# Patient Record
Sex: FEMALE | Race: BLACK OR AFRICAN AMERICAN | NOT HISPANIC OR LATINO | URBAN - METROPOLITAN AREA
[De-identification: names, ages, dates, MRNs, and addresses within clinical notes are randomized per-mention and may not be internally consistent; named-entity substitution may affect disease eponyms.]

---

## 2017-06-08 ENCOUNTER — EMERGENCY (EMERGENCY)
Facility: HOSPITAL | Age: 57
LOS: 1 days | Discharge: HOME | End: 2017-06-08
Attending: EMERGENCY MEDICINE | Admitting: EMERGENCY MEDICINE
Payer: COMMERCIAL

## 2017-06-08 VITALS
HEART RATE: 75 BPM | OXYGEN SATURATION: 97 % | RESPIRATION RATE: 18 BRPM | DIASTOLIC BLOOD PRESSURE: 85 MMHG | SYSTOLIC BLOOD PRESSURE: 141 MMHG | TEMPERATURE: 98 F

## 2017-06-08 DIAGNOSIS — Z88.1 ALLERGY STATUS TO OTHER ANTIBIOTIC AGENTS STATUS: ICD-10-CM

## 2017-06-08 DIAGNOSIS — G89.29 OTHER CHRONIC PAIN: ICD-10-CM

## 2017-06-08 DIAGNOSIS — Z88.8 ALLERGY STATUS TO OTHER DRUGS, MEDICAMENTS AND BIOLOGICAL SUBSTANCES STATUS: ICD-10-CM

## 2017-06-08 DIAGNOSIS — I10 ESSENTIAL (PRIMARY) HYPERTENSION: ICD-10-CM

## 2017-06-08 DIAGNOSIS — M25.561 PAIN IN RIGHT KNEE: ICD-10-CM

## 2017-06-08 DIAGNOSIS — M06.9 RHEUMATOID ARTHRITIS, UNSPECIFIED: ICD-10-CM

## 2017-06-08 PROCEDURE — 73562 X-RAY EXAM OF KNEE 3: CPT | Mod: 26,RT

## 2017-06-08 PROCEDURE — 99283 EMERGENCY DEPT VISIT LOW MDM: CPT

## 2017-06-08 PROCEDURE — 73562 X-RAY EXAM OF KNEE 3: CPT

## 2017-06-08 PROCEDURE — 99283 EMERGENCY DEPT VISIT LOW MDM: CPT | Mod: 25

## 2017-06-08 NOTE — ED PROVIDER NOTE - ATTENDING CONTRIBUTION TO CARE
pt with RA presents with chronic right knee pain, no acute trauma, pe - right anterior knee tenderness no fx on xray, most likely chronic RA - recommend RICE, ortho/pmd/rheum follow up - pt has cane and able to ambulate.

## 2017-06-08 NOTE — ED PROVIDER NOTE - OBJECTIVE STATEMENT
Patient is a 56 yo Female with Pmhx of RA (On Hydroxychloroquine, Methotrexate, Prednisone), who presents to the ED for acute on chronic Right knee pain Patient is a 56 yo Female with Pmhx of RA (On Hydroxychloroquine, Methotrexate, Prednisone), who presents to the ED for acute on chronic Right knee pain. Pt has had ongoing knee pain for years. 4 months ago her Rheumatologist gave her a Prednisone shot but she experienced little to no relief with it. Approx 2 weeks ago she was walking and crossing a sidewalk when she felt as if she stumped on her knee. She carried on with her activities initially but recently has felt that her pain has gotten more swollen and painful and difficult to extend.

## 2017-06-08 NOTE — ED PROVIDER NOTE - PHYSICAL EXAMINATION
.VITAL SIGNS:  T(F): 97.9, Max: 97.9 (06-08 @ 16:06)  HR: 75 (75 - 75)  BP: 141/85 (141/85 - 141/85)  RR: 18 (18 - 18)  SpO2: 97% (97% - 97%)    PHYSICAL EXAM:  Constitutional: WDWN resting comfortably; Midl distress when examined  Head: NC/AT  Eyes: PERRL, EOMI, anicteric sclera  ENT:  MMM  Neck: supple; no JVD or thyromegaly  Respiratory: CTA B/L; no W/R/R,   Cardiac: +S1/S2; RRR; no M/R/G; PMI non-displaced  Gastrointestinal: soft, NT/ND; no rebound or guarding; +BSx4  Extremities: WWP, Right knee swollen, mildly tender. Unable to fully extend her left. Gait hesitant with canne use  Neurologic: AAOx3; CNII-XII grossly intact; no focal deficits

## 2017-06-08 NOTE — ED ADULT NURSE NOTE - OBJECTIVE STATEMENT
57 year old female pt with c/o of R knee pain x1year.  States worsening this week.  A+OX3, ambulatory w steady gait.

## 2017-06-08 NOTE — ED PROVIDER NOTE - MEDICAL DECISION MAKING DETAILS
Patient with chronic right knee pain. Noticed more swelling and pain recently after a sidewalk incident. Was recommend for an Xray by her PMD but failed to do so. PE shows swelling that pt reports is chronic. No crepitus, warmth or tenderness. Xray of the knee shows no fracture, only swelling. Dc with plan to f/u with PMD and rheumatologist

## 2017-07-21 ENCOUNTER — APPOINTMENT (OUTPATIENT)
Dept: OPHTHALMOLOGY | Facility: CLINIC | Age: 57
End: 2017-07-21

## 2017-07-21 RX ORDER — METHOTREXATE 2.5 MG/1
2.5 TABLET ORAL
Qty: 32 | Refills: 6 | Status: ACTIVE | COMMUNITY
Start: 2017-07-21 | End: 1900-01-01

## 2018-01-10 ENCOUNTER — APPOINTMENT (OUTPATIENT)
Dept: OPHTHALMOLOGY | Facility: CLINIC | Age: 58
End: 2018-01-10
Payer: COMMERCIAL

## 2018-01-10 DIAGNOSIS — Z79.899 OTHER LONG TERM (CURRENT) DRUG THERAPY: ICD-10-CM

## 2018-01-10 DIAGNOSIS — H15.013 ANTERIOR SCLERITIS, BILATERAL: ICD-10-CM

## 2018-01-10 DIAGNOSIS — H40.003 PREGLAUCOMA, UNSPECIFIED, BILATERAL: ICD-10-CM

## 2018-01-10 PROCEDURE — 92083 EXTENDED VISUAL FIELD XM: CPT

## 2018-01-10 PROCEDURE — 92134 CPTRZ OPH DX IMG PST SGM RTA: CPT

## 2018-01-10 PROCEDURE — 76514 ECHO EXAM OF EYE THICKNESS: CPT

## 2018-01-10 PROCEDURE — 92014 COMPRE OPH EXAM EST PT 1/>: CPT

## 2018-10-09 ENCOUNTER — EMERGENCY (EMERGENCY)
Facility: HOSPITAL | Age: 58
LOS: 1 days | Discharge: HOME | End: 2018-10-09
Attending: EMERGENCY MEDICINE | Admitting: EMERGENCY MEDICINE
Payer: COMMERCIAL

## 2018-10-09 VITALS
WEIGHT: 208.12 LBS | TEMPERATURE: 98 F | HEIGHT: 68 IN | DIASTOLIC BLOOD PRESSURE: 70 MMHG | SYSTOLIC BLOOD PRESSURE: 124 MMHG | RESPIRATION RATE: 18 BRPM | OXYGEN SATURATION: 98 % | HEART RATE: 87 BPM

## 2018-10-09 DIAGNOSIS — R51 HEADACHE: ICD-10-CM

## 2018-10-09 DIAGNOSIS — Z88.1 ALLERGY STATUS TO OTHER ANTIBIOTIC AGENTS STATUS: ICD-10-CM

## 2018-10-09 DIAGNOSIS — Z91.040 LATEX ALLERGY STATUS: ICD-10-CM

## 2018-10-09 PROBLEM — M06.9 RHEUMATOID ARTHRITIS, UNSPECIFIED: Chronic | Status: ACTIVE | Noted: 2017-06-08

## 2018-10-09 PROCEDURE — 99283 EMERGENCY DEPT VISIT LOW MDM: CPT

## 2018-10-09 PROCEDURE — 99282 EMERGENCY DEPT VISIT SF MDM: CPT

## 2018-10-09 NOTE — ED PROVIDER NOTE - MEDICAL DECISION MAKING DETAILS
57 y/o female with scalp tenderness that improves with essential oils. No findings on physical examination without ttp. Pt denies "headache" and does not want pain medication. Pt wants to make sure the pain does not have anything poking into her head. It does not. Pt advised to use oils if that comforts and eases her pain. Neuro intact. Return to ed for any concerning symptoms.

## 2018-10-09 NOTE — ED PROVIDER NOTE - OBJECTIVE STATEMENT
59 y/o female with a PMHx of RA is present with scalp tenderness x1 day. Pt reports pain located on the right side of her scalp. She states it is a weird sometimes sharp sensation. Pt reports she has had this sensation in the past which normally subsides within 15-20 minutes. Pt reports yesterday she applied "essential oils" to her scalp and the discomfort improved. Pt reports hx of getting "weave" placed on her scalp for many years. The current weave she has in place was woven in 1 month ago. Pt does not want pain medication. She denies the following: dizziness, blurred vision, double vision, nausea/vomiting, chest pain, sob, numbness/tingling, trauma, use of blood thinners, hx of tbi/seizures.

## 2018-10-09 NOTE — ED ADULT NURSE NOTE - NSIMPLEMENTINTERV_GEN_ALL_ED
Implemented All Universal Safety Interventions:  [place] to call system. Call bell, personal items and telephone within reach. Instruct patient to call for assistance. Room bathroom lighting operational. Non-slip footwear when patient is off stretcher. Physically safe environment: no spills, clutter or unnecessary equipment. Stretcher in lowest position, wheels locked, appropriate side rails in place.

## 2018-10-09 NOTE — ED ADULT NURSE NOTE - CHPI ED NUR SYMPTOMS NEG
no blurred vision/no change in level of consciousness/no loss of consciousness/no numbness/no nausea/no confusion/no vomiting/no dizziness/no fever

## 2018-10-09 NOTE — ED PROVIDER NOTE - ATTENDING CONTRIBUTION TO CARE
59 y/o female with a PMHx of RA is presents with right sided sharp scalp pain x 1 day. Yesterday she applied "essential oils" to her scalp and the discomfort improved. Pt reports hx of getting "weave" placed on her scalp for many years. She denies the following: dizziness, blurred vision, double vision, nausea/vomiting, chest pain, sob, numbness/tingling, focal weakness, trauma, use of blood thinners. Pt AAO, NAD, Scalp with no acute findings or rashes- hair weave in place, Neuro intact. Scalp massage with oils and OTC pain meds prn pain. ?weave pulling on her hair. To f/up outpt.

## 2018-12-08 NOTE — ED ADULT NURSE NOTE - GASTROINTESTINAL ASSESSMENT
Ventricular Rate : 54   Atrial Rate : 54   P-R Interval : 182   QRS Duration : 86   Q-T Interval : 418   QTC Calculation(Bezet) : 396   P Axis : 55   R Axis : 14   T Axis : 30   Diagnosis : Sinus bradycardia~Otherwise normal ECG~No previous ECGs available~Confirmed by SHAE ADKINS MASOOD ([id number]) on 11/12/2018 4:08:39 PM     
WDL

## 2019-02-03 ENCOUNTER — RX RENEWAL (OUTPATIENT)
Age: 59
End: 2019-02-03

## 2019-02-03 RX ORDER — PREDNISOLONE ACETATE 10 MG/ML
1 SUSPENSION/ DROPS OPHTHALMIC
Qty: 10 | Refills: 1 | Status: ACTIVE | COMMUNITY
Start: 2018-01-10 | End: 1900-01-01

## 2019-07-31 ENCOUNTER — APPOINTMENT (OUTPATIENT)
Dept: OPHTHALMOLOGY | Facility: CLINIC | Age: 59
End: 2019-07-31
Payer: COMMERCIAL

## 2019-07-31 ENCOUNTER — NON-APPOINTMENT (OUTPATIENT)
Age: 59
End: 2019-07-31

## 2019-07-31 PROCEDURE — 92134 CPTRZ OPH DX IMG PST SGM RTA: CPT

## 2019-07-31 PROCEDURE — 92014 COMPRE OPH EXAM EST PT 1/>: CPT

## 2019-11-25 ENCOUNTER — NON-APPOINTMENT (OUTPATIENT)
Age: 59
End: 2019-11-25

## 2019-11-25 ENCOUNTER — APPOINTMENT (OUTPATIENT)
Dept: OPHTHALMOLOGY | Facility: CLINIC | Age: 59
End: 2019-11-25
Payer: COMMERCIAL

## 2019-11-25 PROCEDURE — 92014 COMPRE OPH EXAM EST PT 1/>: CPT

## 2021-05-11 ENCOUNTER — TRANSCRIPTION ENCOUNTER (OUTPATIENT)
Age: 61
End: 2021-05-11

## 2021-11-18 ENCOUNTER — NON-APPOINTMENT (OUTPATIENT)
Age: 61
End: 2021-11-18

## 2021-11-18 ENCOUNTER — APPOINTMENT (OUTPATIENT)
Dept: OPHTHALMOLOGY | Facility: CLINIC | Age: 61
End: 2021-11-18
Payer: COMMERCIAL

## 2021-11-18 PROCEDURE — 92250 FUNDUS PHOTOGRAPHY W/I&R: CPT

## 2021-11-18 PROCEDURE — 92014 COMPRE OPH EXAM EST PT 1/>: CPT

## 2022-03-22 ENCOUNTER — APPOINTMENT (OUTPATIENT)
Dept: OPHTHALMOLOGY | Facility: CLINIC | Age: 62
End: 2022-03-22

## 2022-07-01 ENCOUNTER — EMERGENCY (EMERGENCY)
Facility: HOSPITAL | Age: 62
LOS: 1 days | Discharge: HOME | End: 2022-07-01
Attending: EMERGENCY MEDICINE | Admitting: EMERGENCY MEDICINE
Payer: COMMERCIAL

## 2022-07-01 VITALS
HEART RATE: 78 BPM | OXYGEN SATURATION: 96 % | SYSTOLIC BLOOD PRESSURE: 166 MMHG | HEIGHT: 68 IN | RESPIRATION RATE: 16 BRPM | TEMPERATURE: 98 F | DIASTOLIC BLOOD PRESSURE: 96 MMHG | WEIGHT: 225.09 LBS

## 2022-07-01 LAB
ALBUMIN SERPL ELPH-MCNC: 4.3 G/DL — SIGNIFICANT CHANGE UP (ref 3.3–5)
ALP SERPL-CCNC: 110 U/L — SIGNIFICANT CHANGE UP (ref 40–120)
ALT FLD-CCNC: 18 U/L — SIGNIFICANT CHANGE UP (ref 10–45)
ANION GAP SERPL CALC-SCNC: 12 MMOL/L — SIGNIFICANT CHANGE UP (ref 5–17)
APPEARANCE UR: CLEAR — SIGNIFICANT CHANGE UP
AST SERPL-CCNC: 21 U/L — SIGNIFICANT CHANGE UP (ref 10–40)
BASOPHILS # BLD AUTO: 0.04 K/UL — SIGNIFICANT CHANGE UP (ref 0–0.2)
BASOPHILS NFR BLD AUTO: 0.6 % — SIGNIFICANT CHANGE UP (ref 0–2)
BILIRUB SERPL-MCNC: 0.4 MG/DL — SIGNIFICANT CHANGE UP (ref 0.2–1.2)
BILIRUB UR-MCNC: NEGATIVE — SIGNIFICANT CHANGE UP
BUN SERPL-MCNC: 15 MG/DL — SIGNIFICANT CHANGE UP (ref 7–23)
CALCIUM SERPL-MCNC: 10.4 MG/DL — SIGNIFICANT CHANGE UP (ref 8.4–10.5)
CHLORIDE SERPL-SCNC: 106 MMOL/L — SIGNIFICANT CHANGE UP (ref 96–108)
CO2 SERPL-SCNC: 23 MMOL/L — SIGNIFICANT CHANGE UP (ref 22–31)
COLOR SPEC: YELLOW — SIGNIFICANT CHANGE UP
CREAT SERPL-MCNC: 0.73 MG/DL — SIGNIFICANT CHANGE UP (ref 0.5–1.3)
DIFF PNL FLD: ABNORMAL
EGFR: 94 ML/MIN/1.73M2 — SIGNIFICANT CHANGE UP
EOSINOPHIL # BLD AUTO: 0.14 K/UL — SIGNIFICANT CHANGE UP (ref 0–0.5)
EOSINOPHIL NFR BLD AUTO: 2 % — SIGNIFICANT CHANGE UP (ref 0–6)
GLUCOSE SERPL-MCNC: 94 MG/DL — SIGNIFICANT CHANGE UP (ref 70–99)
GLUCOSE UR QL: NEGATIVE — SIGNIFICANT CHANGE UP
HCT VFR BLD CALC: 39.5 % — SIGNIFICANT CHANGE UP (ref 34.5–45)
HGB BLD-MCNC: 13.1 G/DL — SIGNIFICANT CHANGE UP (ref 11.5–15.5)
IMM GRANULOCYTES NFR BLD AUTO: 0.1 % — SIGNIFICANT CHANGE UP (ref 0–1.5)
KETONES UR-MCNC: NEGATIVE — SIGNIFICANT CHANGE UP
LEUKOCYTE ESTERASE UR-ACNC: ABNORMAL
LYMPHOCYTES # BLD AUTO: 2.42 K/UL — SIGNIFICANT CHANGE UP (ref 1–3.3)
LYMPHOCYTES # BLD AUTO: 35.4 % — SIGNIFICANT CHANGE UP (ref 13–44)
MCHC RBC-ENTMCNC: 28.8 PG — SIGNIFICANT CHANGE UP (ref 27–34)
MCHC RBC-ENTMCNC: 33.2 GM/DL — SIGNIFICANT CHANGE UP (ref 32–36)
MCV RBC AUTO: 86.8 FL — SIGNIFICANT CHANGE UP (ref 80–100)
MONOCYTES # BLD AUTO: 0.53 K/UL — SIGNIFICANT CHANGE UP (ref 0–0.9)
MONOCYTES NFR BLD AUTO: 7.7 % — SIGNIFICANT CHANGE UP (ref 2–14)
NEUTROPHILS # BLD AUTO: 3.7 K/UL — SIGNIFICANT CHANGE UP (ref 1.8–7.4)
NEUTROPHILS NFR BLD AUTO: 54.2 % — SIGNIFICANT CHANGE UP (ref 43–77)
NITRITE UR-MCNC: NEGATIVE — SIGNIFICANT CHANGE UP
NRBC # BLD: 0 /100 WBCS — SIGNIFICANT CHANGE UP (ref 0–0)
PH UR: 6.5 — SIGNIFICANT CHANGE UP (ref 5–8)
PLATELET # BLD AUTO: 178 K/UL — SIGNIFICANT CHANGE UP (ref 150–400)
POTASSIUM SERPL-MCNC: 3.6 MMOL/L — SIGNIFICANT CHANGE UP (ref 3.5–5.3)
POTASSIUM SERPL-SCNC: 3.6 MMOL/L — SIGNIFICANT CHANGE UP (ref 3.5–5.3)
PROT SERPL-MCNC: 7.5 G/DL — SIGNIFICANT CHANGE UP (ref 6–8.3)
PROT UR-MCNC: NEGATIVE MG/DL — SIGNIFICANT CHANGE UP
RBC # BLD: 4.55 M/UL — SIGNIFICANT CHANGE UP (ref 3.8–5.2)
RBC # FLD: 14.8 % — HIGH (ref 10.3–14.5)
SODIUM SERPL-SCNC: 141 MMOL/L — SIGNIFICANT CHANGE UP (ref 135–145)
SP GR SPEC: 1.02 — SIGNIFICANT CHANGE UP (ref 1–1.03)
UROBILINOGEN FLD QL: 0.2 E.U./DL — SIGNIFICANT CHANGE UP
WBC # BLD: 6.84 K/UL — SIGNIFICANT CHANGE UP (ref 3.8–10.5)
WBC # FLD AUTO: 6.84 K/UL — SIGNIFICANT CHANGE UP (ref 3.8–10.5)

## 2022-07-01 PROCEDURE — 99285 EMERGENCY DEPT VISIT HI MDM: CPT

## 2022-07-01 NOTE — ED ADULT NURSE NOTE - OBJECTIVE STATEMENT
Pt presents to the ED c/o left flank pain that began a few hours ago. Pt w/ hx of kidney stones. Pt reports UTI 3 weeks ago. Denies fever, chills, NVD, urinary sx, abdominal pain.    labs were obtained and sent, as noted, awaiting provider eval.

## 2022-07-01 NOTE — ED ADULT TRIAGE NOTE - CHIEF COMPLAINT QUOTE
Pt presents to the ED c/o left flank pain that began a few hours ago. Pt w/ hx of kidney stones. Pt reports UTI 3 weeks ago. Denies fever, chills, NVD, urinary sx, abdominal pain.

## 2022-07-01 NOTE — ED ADULT NURSE REASSESSMENT NOTE - NS ED NURSE REASSESS COMMENT FT1
interventions as noted, snehal. well, assessment on-going, awaiting provider eval/further orders, pt. utd on current poc, with understanding verbalized

## 2022-07-02 VITALS — SYSTOLIC BLOOD PRESSURE: 133 MMHG | OXYGEN SATURATION: 98 % | DIASTOLIC BLOOD PRESSURE: 84 MMHG | HEART RATE: 66 BPM

## 2022-07-02 PROCEDURE — 36415 COLL VENOUS BLD VENIPUNCTURE: CPT

## 2022-07-02 PROCEDURE — 74176 CT ABD & PELVIS W/O CONTRAST: CPT | Mod: 26,MA

## 2022-07-02 PROCEDURE — 85025 COMPLETE CBC W/AUTO DIFF WBC: CPT

## 2022-07-02 PROCEDURE — 81001 URINALYSIS AUTO W/SCOPE: CPT

## 2022-07-02 PROCEDURE — 80053 COMPREHEN METABOLIC PANEL: CPT

## 2022-07-02 PROCEDURE — 74176 CT ABD & PELVIS W/O CONTRAST: CPT | Mod: MA

## 2022-07-02 PROCEDURE — 87086 URINE CULTURE/COLONY COUNT: CPT

## 2022-07-02 PROCEDURE — 99284 EMERGENCY DEPT VISIT MOD MDM: CPT | Mod: 25

## 2022-07-02 RX ORDER — AZTREONAM 2 G
1 VIAL (EA) INJECTION
Qty: 20 | Refills: 0
Start: 2022-07-02 | End: 2022-07-11

## 2022-07-02 RX ADMIN — Medication 1 TABLET(S): at 02:00

## 2022-07-02 NOTE — ED PROVIDER NOTE - PATIENT PORTAL LINK FT
You can access the FollowMyHealth Patient Portal offered by [facility] by registering at the following website: [email or web address]. By joining [facility]’s FollowMyHealth portal, you will also be able to view your health information using other applications (apps) compatible with our system.

## 2022-07-02 NOTE — ED PROVIDER NOTE - NSFOLLOWUPINSTRUCTIONS_ED_ALL_ED_FT
Follow-up with your urologist      Flank Pain, Adult    Flank pain is pain that is located on the side of the body between the upper abdomen and the spine. This area is called the flank. The pain may occur over a short period of time (acute), or it may be long-term or recurring (chronic). It may be mild or severe. Flank pain can be caused by many things, including:  •Muscle soreness or injury.    •Kidney infection, kidney stones, or kidney disease.    •Stress.    •A disease of the spine (vertebral disk disease).    •A lung infection (pneumonia).    •Fluid around the lungs (pulmonary edema).    •A skin rash caused by the chickenpox virus (shingles).    •Tumors that affect the back of the abdomen.    •Gallbladder disease.    Follow these instructions at home:  A comparison of three sample cups showing dark yellow, yellow, and pale yellow urine.   •Drink enough fluid to keep your urine pale yellow.    •Rest as told by your health care provider.    •Take over-the-counter and prescription medicines only as told by your health care provider.    •Keep a journal to track what has caused your flank pain and what has made it feel better.    •Keep all follow-up visits. This is important.    Contact a health care provider if:    •Your pain is not controlled with medicine.    •You have new symptoms.    •Your pain gets worse.    •Your symptoms last longer than 2–3 days.    •You have trouble urinating or you are urinating very frequently.    Get help right away if:    •You have trouble breathing or you are short of breath.    •Your abdomen hurts or it is swollen or red.    •You have nausea or vomiting.    •You feel faint, or you faint.    •You have blood in your urine.    •You have flank pain and a fever.    These symptoms may represent a serious problem that is an emergency. Do not wait to see if the symptoms will go away. Get medical help right away. Call your local emergency services (911 in the [place] Do not drive yourself to the hospital.     Summary    •Flank pain is pain that is located on the side of the body between the upper abdomen and the spine.    •The pain may occur over a short period of time (acute), or it may be long-term or recurring (chronic). It may be mild or severe.    •Flank pain can be caused by many things.    •Contact your health care provider if your symptoms get worse or last longer than 2–3 days.    This information is not intended to replace advice given to you by your health care provider. Make sure you discuss any questions you have with your health care provider.

## 2022-07-02 NOTE — ED PROVIDER NOTE - NS ED MD DISPO DISCHARGE
pt left seated in chair post Eval; chair alarm donned; LE's elevated on stool/pillow; callbell in reach; pt instructed not to get up alone; call nursing for assist; kyle well; pt denied pain post Eval; RN Katelyn made aware pt OOB Home

## 2022-07-02 NOTE — ED PROVIDER NOTE - PROGRESS NOTE DETAILS
b/l nephrolithiasis, will treat bacteriuria. recommend  f/u  I have discussed the discharge plan with the patient. The patient agrees with the plan, as discussed.  The patient understands Emergency Department diagnosis is a preliminary diagnosis often based on limited information and that the patient must adhere to the follow-up plan as discussed.  The patient understands that if the symptoms worsen or if prescribed medications do not have the desired/planned effect that the patient may return to the Emergency Department at any time for further evaluation and treatment.

## 2022-07-02 NOTE — ED PROVIDER NOTE - CLINICAL SUMMARY MEDICAL DECISION MAKING FREE TEXT BOX
left flank pain, no abd pian on exam, no guarding, no rebound, afebrile  -check labs  -check ua  -CT  pt declined pain medication at this time

## 2022-07-02 NOTE — ED PROVIDER NOTE - OBJECTIVE STATEMENT
62F hx RA, htn, c/o left flank pain. pt states started today. no radiation. no fevers, no n/v/d. states was treated for UTI with bactrim 3 wks ago.  no dysuria currently. pt states has had kidney stones in the past.

## 2022-07-04 DIAGNOSIS — M06.9 RHEUMATOID ARTHRITIS, UNSPECIFIED: ICD-10-CM

## 2022-07-04 DIAGNOSIS — I10 ESSENTIAL (PRIMARY) HYPERTENSION: ICD-10-CM

## 2022-07-04 DIAGNOSIS — Z91.040 LATEX ALLERGY STATUS: ICD-10-CM

## 2022-07-04 DIAGNOSIS — R10.12 LEFT UPPER QUADRANT PAIN: ICD-10-CM

## 2022-07-04 DIAGNOSIS — R10.32 LEFT LOWER QUADRANT PAIN: ICD-10-CM

## 2022-07-04 DIAGNOSIS — Z88.1 ALLERGY STATUS TO OTHER ANTIBIOTIC AGENTS STATUS: ICD-10-CM

## 2022-07-19 PROBLEM — H40.003 GLAUCOMA SUSPECT OF BOTH EYES: Status: ACTIVE | Noted: 2017-07-21

## 2022-07-24 ENCOUNTER — EMERGENCY (EMERGENCY)
Facility: HOSPITAL | Age: 62
LOS: 1 days | Discharge: HOME | End: 2022-07-24
Attending: EMERGENCY MEDICINE | Admitting: EMERGENCY MEDICINE
Payer: COMMERCIAL

## 2022-07-24 VITALS
HEART RATE: 98 BPM | DIASTOLIC BLOOD PRESSURE: 94 MMHG | RESPIRATION RATE: 18 BRPM | TEMPERATURE: 98 F | HEIGHT: 68 IN | OXYGEN SATURATION: 97 % | SYSTOLIC BLOOD PRESSURE: 157 MMHG | WEIGHT: 223.11 LBS

## 2022-07-24 VITALS
HEART RATE: 68 BPM | DIASTOLIC BLOOD PRESSURE: 81 MMHG | SYSTOLIC BLOOD PRESSURE: 135 MMHG | RESPIRATION RATE: 18 BRPM | TEMPERATURE: 98 F | OXYGEN SATURATION: 98 %

## 2022-07-24 LAB
ALBUMIN SERPL ELPH-MCNC: 4.5 G/DL — SIGNIFICANT CHANGE UP (ref 3.3–5)
ALP SERPL-CCNC: 100 U/L — SIGNIFICANT CHANGE UP (ref 40–120)
ALT FLD-CCNC: 15 U/L — SIGNIFICANT CHANGE UP (ref 10–45)
ANION GAP SERPL CALC-SCNC: 9 MMOL/L — SIGNIFICANT CHANGE UP (ref 5–17)
APPEARANCE UR: CLEAR — SIGNIFICANT CHANGE UP
AST SERPL-CCNC: 17 U/L — SIGNIFICANT CHANGE UP (ref 10–40)
BACTERIA # UR AUTO: PRESENT /HPF
BASOPHILS # BLD AUTO: 0.03 K/UL — SIGNIFICANT CHANGE UP (ref 0–0.2)
BASOPHILS NFR BLD AUTO: 0.4 % — SIGNIFICANT CHANGE UP (ref 0–2)
BILIRUB SERPL-MCNC: 0.4 MG/DL — SIGNIFICANT CHANGE UP (ref 0.2–1.2)
BILIRUB UR-MCNC: NEGATIVE — SIGNIFICANT CHANGE UP
BUN SERPL-MCNC: 12 MG/DL — SIGNIFICANT CHANGE UP (ref 7–23)
CALCIUM SERPL-MCNC: 10.6 MG/DL — HIGH (ref 8.4–10.5)
CHLORIDE SERPL-SCNC: 107 MMOL/L — SIGNIFICANT CHANGE UP (ref 96–108)
CO2 SERPL-SCNC: 26 MMOL/L — SIGNIFICANT CHANGE UP (ref 22–31)
COLOR SPEC: YELLOW — SIGNIFICANT CHANGE UP
CREAT SERPL-MCNC: 0.66 MG/DL — SIGNIFICANT CHANGE UP (ref 0.5–1.3)
DIFF PNL FLD: ABNORMAL
EGFR: 100 ML/MIN/1.73M2 — SIGNIFICANT CHANGE UP
EOSINOPHIL # BLD AUTO: 0.06 K/UL — SIGNIFICANT CHANGE UP (ref 0–0.5)
EOSINOPHIL NFR BLD AUTO: 0.8 % — SIGNIFICANT CHANGE UP (ref 0–6)
EPI CELLS # UR: ABNORMAL /HPF (ref 0–5)
GLUCOSE SERPL-MCNC: 100 MG/DL — HIGH (ref 70–99)
GLUCOSE UR QL: NEGATIVE — SIGNIFICANT CHANGE UP
HCT VFR BLD CALC: 42.4 % — SIGNIFICANT CHANGE UP (ref 34.5–45)
HGB BLD-MCNC: 13.8 G/DL — SIGNIFICANT CHANGE UP (ref 11.5–15.5)
IMM GRANULOCYTES NFR BLD AUTO: 0.3 % — SIGNIFICANT CHANGE UP (ref 0–1.5)
KETONES UR-MCNC: NEGATIVE — SIGNIFICANT CHANGE UP
LEUKOCYTE ESTERASE UR-ACNC: ABNORMAL
LYMPHOCYTES # BLD AUTO: 1.69 K/UL — SIGNIFICANT CHANGE UP (ref 1–3.3)
LYMPHOCYTES # BLD AUTO: 21.8 % — SIGNIFICANT CHANGE UP (ref 13–44)
MAGNESIUM SERPL-MCNC: 2 MG/DL — SIGNIFICANT CHANGE UP (ref 1.6–2.6)
MCHC RBC-ENTMCNC: 28.5 PG — SIGNIFICANT CHANGE UP (ref 27–34)
MCHC RBC-ENTMCNC: 32.5 GM/DL — SIGNIFICANT CHANGE UP (ref 32–36)
MCV RBC AUTO: 87.4 FL — SIGNIFICANT CHANGE UP (ref 80–100)
MONOCYTES # BLD AUTO: 0.42 K/UL — SIGNIFICANT CHANGE UP (ref 0–0.9)
MONOCYTES NFR BLD AUTO: 5.4 % — SIGNIFICANT CHANGE UP (ref 2–14)
NEUTROPHILS # BLD AUTO: 5.55 K/UL — SIGNIFICANT CHANGE UP (ref 1.8–7.4)
NEUTROPHILS NFR BLD AUTO: 71.3 % — SIGNIFICANT CHANGE UP (ref 43–77)
NITRITE UR-MCNC: NEGATIVE — SIGNIFICANT CHANGE UP
NRBC # BLD: 0 /100 WBCS — SIGNIFICANT CHANGE UP (ref 0–0)
PH UR: 6 — SIGNIFICANT CHANGE UP (ref 5–8)
PLATELET # BLD AUTO: 169 K/UL — SIGNIFICANT CHANGE UP (ref 150–400)
POTASSIUM SERPL-MCNC: 4 MMOL/L — SIGNIFICANT CHANGE UP (ref 3.5–5.3)
POTASSIUM SERPL-SCNC: 4 MMOL/L — SIGNIFICANT CHANGE UP (ref 3.5–5.3)
PROT SERPL-MCNC: 7.8 G/DL — SIGNIFICANT CHANGE UP (ref 6–8.3)
PROT UR-MCNC: NEGATIVE MG/DL — SIGNIFICANT CHANGE UP
RBC # BLD: 4.85 M/UL — SIGNIFICANT CHANGE UP (ref 3.8–5.2)
RBC # FLD: 14.8 % — HIGH (ref 10.3–14.5)
RBC CASTS # UR COMP ASSIST: < 5 /HPF — SIGNIFICANT CHANGE UP
SARS-COV-2 RNA SPEC QL NAA+PROBE: NEGATIVE — SIGNIFICANT CHANGE UP
SODIUM SERPL-SCNC: 142 MMOL/L — SIGNIFICANT CHANGE UP (ref 135–145)
SP GR SPEC: <=1.005 — SIGNIFICANT CHANGE UP (ref 1–1.03)
UROBILINOGEN FLD QL: 0.2 E.U./DL — SIGNIFICANT CHANGE UP
WBC # BLD: 7.77 K/UL — SIGNIFICANT CHANGE UP (ref 3.8–10.5)
WBC # FLD AUTO: 7.77 K/UL — SIGNIFICANT CHANGE UP (ref 3.8–10.5)
WBC UR QL: > 10 /HPF

## 2022-07-24 PROCEDURE — 83735 ASSAY OF MAGNESIUM: CPT

## 2022-07-24 PROCEDURE — 36415 COLL VENOUS BLD VENIPUNCTURE: CPT

## 2022-07-24 PROCEDURE — 81001 URINALYSIS AUTO W/SCOPE: CPT

## 2022-07-24 PROCEDURE — 80053 COMPREHEN METABOLIC PANEL: CPT

## 2022-07-24 PROCEDURE — 96374 THER/PROPH/DIAG INJ IV PUSH: CPT

## 2022-07-24 PROCEDURE — 87086 URINE CULTURE/COLONY COUNT: CPT

## 2022-07-24 PROCEDURE — 99284 EMERGENCY DEPT VISIT MOD MDM: CPT | Mod: 25

## 2022-07-24 PROCEDURE — 93010 ELECTROCARDIOGRAM REPORT: CPT | Mod: NC

## 2022-07-24 PROCEDURE — 93005 ELECTROCARDIOGRAM TRACING: CPT

## 2022-07-24 PROCEDURE — 99284 EMERGENCY DEPT VISIT MOD MDM: CPT

## 2022-07-24 PROCEDURE — 85025 COMPLETE CBC W/AUTO DIFF WBC: CPT

## 2022-07-24 PROCEDURE — 87635 SARS-COV-2 COVID-19 AMP PRB: CPT

## 2022-07-24 RX ORDER — ONDANSETRON 8 MG/1
4 TABLET, FILM COATED ORAL ONCE
Refills: 0 | Status: COMPLETED | OUTPATIENT
Start: 2022-07-24 | End: 2022-07-24

## 2022-07-24 RX ORDER — SODIUM CHLORIDE 9 MG/ML
1000 INJECTION INTRAMUSCULAR; INTRAVENOUS; SUBCUTANEOUS ONCE
Refills: 0 | Status: COMPLETED | OUTPATIENT
Start: 2022-07-24 | End: 2022-07-24

## 2022-07-24 RX ADMIN — SODIUM CHLORIDE 1000 MILLILITER(S): 9 INJECTION INTRAMUSCULAR; INTRAVENOUS; SUBCUTANEOUS at 16:44

## 2022-07-24 RX ADMIN — ONDANSETRON 4 MILLIGRAM(S): 8 TABLET, FILM COATED ORAL at 16:44

## 2022-07-24 NOTE — ED PROVIDER NOTE - PHYSICAL EXAMINATION
VITAL SIGNS: I have reviewed nursing notes and confirm.  CONSTITUTIONAL: Well appearing, in no acute distress.   SKIN:  warm and dry, no acute rash.   HEAD:  normocephalic, atraumatic.  EYES: EOM intact; conjunctiva and sclera clear.  ENT: No nasal discharge; airway clear.  Mild dry mucosa membrane.   NECK: Supple; non tender.  CARD: S1, S2 normal; no murmurs, gallops, or rubs. Regular rate and rhythm.   RESP:  Clear to auscultation b/l, no wheezes, rales or rhonchi.  ABD: Normal bowel sounds; soft; non-distended; non-tender; no guarding/ rebound.  EXT: Normal ROM. No clubbing, cyanosis or edema. 2+ pulses to b/l ue/le.  NEURO: Alert, oriented, grossly unremarkable  PSYCH: Cooperative, mood and affect appropriate.

## 2022-07-24 NOTE — ED PROVIDER NOTE - OBJECTIVE STATEMENT
63 y/o F with a PMHX HTN, RA, recent covid infection presents to the ED c/o not feeling well today and feeling lightheaded and dizziness. Pt states she had 2 episodes of nausea and vomiting NBNB emesis. States her stool is soft with no blood or melena and no associated abdominal pain. Pt has no fever, chills, CP, SOB, syncope. Pt is having increased urination and dysuria for several weeks, pt was treated for UTI with a 10 day course of antibiotics. Urine sample from 2 days ago was cultured and was negative for significant growth . Pt has no abnormal vaginal bleeding or discharge. 61 y/o F with a PMHX HTN, RA, recent covid infection presents to the ED c/o not feeling well today and feeling lightheaded and dizzy. Pt states she had 2 episodes of nausea and vomiting NBNB emesis today. Also states her stool is soft with no blood or melena and no associated abdominal pain. Pt has no fever, chills, CP, SOB, syncope. Pt is having increased urination and dysuria for several weeks, pt was treated for UTI with a 10 day course of antibiotics. Urine sample from 2 days ago was cultured and was negative for significant growth despite leuk esterase and 11-30 wbcs. Pt has no abnormal vaginal bleeding or discharge.

## 2022-07-24 NOTE — ED PROVIDER NOTE - CLINICAL SUMMARY MEDICAL DECISION MAKING FREE TEXT BOX
Impression:   61 y/o F with a pmhx of HTN, RA, recent covid infection now with nausea, vomiting, diarrhea and dizziness. Pt is afebrile and hemodynamically stable. Pt exam findings are remarkable for mild dry mucosa membrane and abdomen exam benign. EKG is non ischemic with no changes from prior. Pt with no leukocytosis, will check CMP for electrolyte imbalance, PAMELA, and check UA to evaluate for infection. Will hydrate with IV fluids. Impression:   61 y/o F with a pmhx of HTN, RA, recent covid infection now with nausea, vomiting, diarrhea and dizziness. Pt is afebrile and hemodynamically stable. Pt exam findings are remarkable for mild dry mucosa membrane; abdomen exam benign. EKG is non ischemic with non-specific twi. No leukocytosis; ca minimally elev at 10.6; remainder of electrolytes and renal function wnl. UA results are similar to outpt ua on 7/22. Will send ucx and hold on tx for uti given neg ucx at the [facility]. Suspect sx's and lab findings 2/2 dehydration. Pt hydrated with ivf, given zofran with improvement of sx's. Pt now tolerating po. ED evaluation and management discussed with the patient in detail.  Close PMD follow up encouraged.  Strict ED return instructions discussed in detail and patient given the opportunity to ask any questions about their discharge diagnosis and instructions. Patient verbalized understanding.

## 2022-07-24 NOTE — ED ADULT NURSE NOTE - TEMPLATE
[address] [place] [address] PRIMARY CARE  [id number] [facility] [address]  Dept: [phone]    Claudene Phoenix is a 56 y.o. male who presents today for his medical conditions/complaintsas noted below. Chief Complaint   Patient presents with    Other     Discuss appt. with Dr. Romero Organ       HPI:     HPI  Pt states Percocet really didn't help. No fever or chills. Was started on neurontin by neurology. States appears to be helping. Pt had positive lily done. Had positive Anti ds dna. Pt has appt with orthopedics at [facility] 1/28. Pain in back- 6 of 10. LDL Cholesterol (mg/dL)   Date Value   02/10/2018 94   07/23/2017 98       (goal LDL is <100)   AST (U/L)   Date Value   02/10/2018 19     ALT (U/L)   Date Value   02/10/2018 19     BUN (mg/dL)   Date Value   09/24/2019 12     BP Readings from Last 3 Encounters:   01/06/20 138/70   10/30/19 119/77   10/09/19 106/76          (goal 120/80)    Past Medical History:   Diagnosis Date    Arthritis     BACK AND FINGERS    COPD (chronic obstructive pulmonary disease) (HCC)     pt denies    GERD (gastroesophageal reflux disease)     H/O chest pain     H/O dermatitis     [place] (hard of hearing)     LEFT EAR WORSE THAN RIGHT.  Hypertension     Kyphosis     Left eye injury     BUNGY CORD STRAP BUSTED AND INJURED LEFT EYE    Lump of skin     Muscle spasm     LOWER RIGHT BACK    Nocturia     Prolonged emergence from general anesthesia     PATIENT STATES HIS B/P WOULD DROP WITH INTUBATION, GO UP AFTER ET TUBE REMOVED.  PVC's (premature ventricular contractions)     PVC'S.  NO CARDIOLOGIST, PCP DR Ying Sing    Scratch of forearm     Snores     mild, denies apnea, does \"grind\" his teeth    Use of cane as ambulatory aid     Uses walker     Wears glasses     Wears partial dentures     LOWER      Past Surgical History:   Procedure Laterality Date    BACK SURGERY  2006    cage    COLONOSCOPY      CYST REMOVAL Right     index finger    HEMORRHOID SURGERY  2017    HERNIA REPAIR      UMBILICAL    NOSE SURGERY      REALIGNED NOSE    [place] COLSC FLX W/REMOVAL LESION BY HOT BX FORCEPS N/A 2017    COLONOSCOPY POLYPECTOMY HOT BIOPSY performed by Macario Domingo MD at [facility] TOE SURGERY Right     2nd toe    TONSILLECTOMY      AS A CHILD    VASECTOMY         Family History   Problem Relation Age of Onset    Coronary Art Dis Father         premature-- at 52    Diabetes Father     High Blood Pressure Father     Other Father         COPD, EMPHYSEMA    Coronary Art Dis Paternal Uncle         premature    Heart Failure Paternal Uncle     Diabetes Mother        Social History     Tobacco Use    Smoking status: Former Smoker     Packs/day: 1.50     Years: 23.00     Pack years: 34.50     Types: Cigarettes     Last attempt to quit:      Years since quittin.0    Smokeless tobacco: Former User     Types: Chew     Quit date: 9/10/2019   Substance Use Topics    Alcohol use: Not Currently     Comment: He states \"a lot\" daily, quit 19      Current Outpatient Medications   Medication Sig Dispense Refill    gabapentin (NEURONTIN) 300 MG capsule Take 1 capsule by mouth 3 times daily for 30 days. 1 tab a day for 3 days then 1 tab BID for 3 days then 1 tab TID 90 capsule 2    metoprolol tartrate (LOPRESSOR) 50 MG tablet TAKE 1 TABLET BY MOUTH TWO TIMES A DAY  180 tablet 2    LORazepam (ATIVAN) 1 MG tablet Take 1 mg by mouth every 6 hours as needed for Anxiety.  Acetaminophen (TYLENOL ARTHRITIS PAIN PO) Take by mouth      diazepam (VALIUM) 10 MG tablet Take 10 mg by mouth every 6 hours as needed for Anxiety.       glucosamine-chondroitin 500-400 MG tablet Take 1 tablet by mouth daily      furosemide (LASIX) 20 MG tablet Take 1 tablet by mouth 2 times daily 60 tablet 0    meloxicam (MOBIC) 15 MG tablet Take 1 tablet by mouth daily 90 tablet 3    lisinopril (PRINIVIL;ZESTRIL) 10 MG tablet TAKE 1 Psychiatric/Behavioral: Negative for sleep disturbance. Objective:     /70   Pulse 78   Ht 6' (1.829 m)   Wt 275 lb 6.4 oz (124.9 kg)   SpO2 97%   BMI 37.35 kg/m²   Physical Exam  Vitals signs and nursing note reviewed. Constitutional:       General: He is not in acute distress. Appearance: He is well-developed. HENT:      Head: Normocephalic and atraumatic. Eyes:      General: No scleral icterus. Right eye: No discharge. Left eye: No discharge. Conjunctiva/sclera: Conjunctivae normal.      Pupils: Pupils are equal, round, and reactive to light. Neck:      Thyroid: No thyromegaly. Trachea: No tracheal deviation. Cardiovascular:      Rate and Rhythm: Normal rate and regular rhythm. Heart sounds: Normal heart sounds. Comments: No carotid bruits  Pulmonary:      Effort: Pulmonary effort is normal. No respiratory distress. Breath sounds: Normal breath sounds. No wheezing. Lymphadenopathy:      Cervical: No cervical adenopathy. Skin:     General: Skin is warm. Findings: No rash. Neurological:      Mental Status: He is alert and oriented to person, place, and time. Psychiatric:         Behavior: Behavior normal.         Thought Content: Thought content normal.       1. Abnormal weight gain  Labs ordered   - T4, Free; Future  - TSH; Future    2. Lumbar radiculopathy  Stable. Increase neurontin to 300mg tid  Offered referral to pain management for second opinion, possible injections. - gabapentin (NEURONTIN) 300 MG capsule; Take 1 capsule by mouth 3 times daily for 30 days. 1 tab a day for 3 days then 1 tab BID for 3 days then 1 tab TID  Dispense: 90 capsule; Refill: 2    3. Alcohol dependence in early full remission ([address]  Stable. No alcohol for 2.5 years    4. Pulmonary granuloma (HCC)  Stable.      5. Positive CONRADO (antinuclear antibody)  Refer to rheumatology for second opinion   - Lucy Simmons MD, Rheumatology, [place]      Assessment:       Diagnosis Orders   1. Abnormal weight gain  T4, Free    TSH   2. Lumbar radiculopathy  gabapentin (NEURONTIN) 300 MG capsule   3. Alcohol dependence in early full remission ([address]     4. Pulmonary granuloma ([address]     5. Positive CONRADO (antinuclear antibody)  ROS - Maryann Bacon MD, Rheumatology, [place]   6. Throat fullness  US THYROID   7. Personal history of tobacco use  CT lung screen [Initial/Annual]        Plan:    increase neurontin to 300mg three times a day  Offered referral to pain management for second opinion. Pt to keep appt with [facility] regarding back pain  Refer to rheumatology to r/o RA, Lupus    Low dose CT chest     Return in about 3 months (around 2020). Orders Placed This Encounter   Procedures    US THYROID     Standing Status:   Future     Standing Expiration Date:   2021     Order Specific Question:   Reason for exam:     Answer:   throat fullness    CT lung screen [Initial/Annual]     Age: 56 y.o. Smoking History:   Social History    Tobacco Use      Smoking status: Former Smoker        Packs/day: 1.50        Years: 23.00        Pack years: 34.5        Types: Cigarettes        Quit date:         Years since quittin.0      Smokeless tobacco: Former User        Types: [address] [facility] [address] date: 9/10/2019    Alcohol use: Not Currently      Comment: He states \"a lot\" daily, quit 19    Drug use: No    Pack years: 34.5  Last CT lung screen: [unfilled]     Standing Status:   Future     Standing Expiration Date:   2021     Order Specific Question:   Is there documentation of shared decision making? Answer:   Yes     Order Specific Question:   Does the patient show any signs or symptoms of lung cancer? Answer:   No     Order Specific Question:   Is this the first (baseline) CT or an annual exam?     Answer:   Baseline [1]     Order Specific Question:   Is this a low dose CT or a routine CT?      Answer:   Low Dose CT [1]     Order Specific procedures. Over diagnosis risk - 10% to 12% of screen-detected lung cancer cases are over diagnosed--that is, the cancer would not have been detected in the patient's lifetime without the screening. Need for follow up screens annually to continue lung cancer screening effectiveness     Risks associated with radiation from annual LDCT- Radiation exposure is about the same as for a mammogram, which is about 1/3 of the annual background radiation exposure from everyday life. Starting screening at age 54 is not likely to increase cancer risk from radiation exposure. Patients with comorbidities resulting in life expectancy of < 10 years, or that would preclude treatment of an abnormality identified on CT, should not be screened due to lack of benefit.     To obtain maximal benefit from this screening, smoking cessation and long-term abstinence from smoking is critical General

## 2022-07-24 NOTE — ED PROVIDER NOTE - CONSTITUTIONAL NEGATIVE STATEMENT, MLM
no fever and no chills. Split-Thickness Skin Graft Text: The defect edges were debeveled with a #15 scalpel blade.  Given the location of the defect, shape of the defect and the proximity to free margins a split thickness skin graft was deemed most appropriate.  Using a sterile surgical marker, the primary defect shape was transferred to the donor site. The split thickness graft was then harvested.  The skin graft was then placed in the primary defect and oriented appropriately.

## 2022-07-24 NOTE — ED ADULT TRIAGE NOTE - CHIEF COMPLAINT QUOTE
"I started to feel like I am going to pass out today and dizzy, I just got over covid 1 week ago and I am just concerned because I also have nausea and vomited 2 times".

## 2022-07-24 NOTE — ED PROVIDER NOTE - NSFOLLOWUPINSTRUCTIONS_ED_ALL_ED_FT
Drink plenty of fluids.  Follow BRAT diet (bananas, rice, applesauce, toast).  Follow up with your primary care physician for re-evaluation.  Return to er for any new or worsening symptoms (chest pain, difficulty breathing, dizziness, passing out...).      EnglishSpanish                                                                                          Dizziness      Dizziness is a common problem. It makes you feel unsteady or light-headed. You may feel like you are about to pass out (faint). Dizziness can lead to getting hurt if you stumble or fall. Dizziness can be caused by many things, including:  •Medicines.      •Not having enough water in your body (dehydration).      •Illness.        Follow these instructions at home:      Eating and drinking   A comparison of three sample cups showing dark yellow, yellow, and pale yellow urine.   •Drink enough fluid to keep your pee (urine) pale yellow. This helps to keep you from getting dehydrated. Try to drink more clear fluids, such as water.      • Do not drink alcohol.      •Limit how much caffeine you drink or eat, if your doctor tells you to do that.      •Limit how much salt (sodium) you drink or eat, if your doctor tells you to do that.        Activity   A sign showing that a person should not drive. •Avoid making quick movements.  •Stand up slowly from sitting in a chair, and steady yourself until you feel okay.      •In the morning, first sit up on the side of the bed. When you feel okay, stand up slowly while you hold onto something. Do this until you know that your balance is okay.        •If you need to  one place for a long time, move your legs often. Tighten and relax the muscles in your legs while you are standing.      • Do not drive or use machinery if you feel dizzy.      •Avoid bending down if you feel dizzy. Place items in your home so you can reach them easily without leaning over.      Lifestyle     • Do not smoke or use any products that contain nicotine or tobacco. If you need help quitting, ask your doctor.      •Try to lower your stress level. You can do this by using methods such as yoga or meditation. Talk with your doctor if you need help.      General instructions     •Watch your dizziness for any changes.      •Take over-the-counter and prescription medicines only as told by your doctor. Talk with your doctor if you think that you are dizzy because of a medicine that you are taking.      •Tell a friend or a family member that you are feeling dizzy. If he or she notices any changes in your behavior, have this person call your doctor.      •Keep all follow-up visits.        Contact a doctor if:    •Your dizziness does not go away.      •Your dizziness or light-headedness gets worse.      •You feel like you may vomit (are nauseous).      •You have trouble hearing.      •You have new symptoms.      •You are unsteady on your feet.      •You feel like the room is spinning.      •You have neck pain or a stiff neck.      •You have a fever.        Get help right away if:    •You vomit or have watery poop (diarrhea), and you cannot eat or drink anything.    •You have trouble:  •Talking.      •Walking.      •Swallowing.      •Using your arms, hands, or legs.        •You feel generally weak.      •You are not thinking clearly, or you have trouble forming sentences. A friend or family member may notice this.    •You have:  •Chest pain.      •Pain in your belly (abdomen).      •Shortness of breath.      •Sweating.        •Your vision changes.      •You are bleeding.      •You have a very bad headache.      These symptoms may be an emergency. Get help right away. Call your local emergency services (911 in the [place]   • Do not wait to see if the symptoms will go away.        • Do not drive yourself to the hospital.         Summary    •Dizziness makes you feel unsteady or light-headed. You may feel like you are about to pass out (faint).      •Drink enough fluid to keep your pee (urine) pale yellow. Do not drink alcohol.      •Avoid making quick movements if you feel dizzy.      •Watch your dizziness for any changes.      This information is not intended to replace advice given to you by your health care provider. Make sure you discuss any questions you have with your health care provider.      Document Revised: 11/22/2021 Document Reviewed: 11/22/2021    [employer] Patient Education © 2022 [employer]         [facility]                                                                                        Dehydration, Adult      Dehydration is condition in which there is not enough water or other fluids in the body. This happens when a person loses more fluids than he or she takes in. Important body parts cannot work right without the right amount of fluids. Any loss of fluids from the body can cause dehydration.    Dehydration can be mild, worse, or very bad. It should be treated right away to keep it from getting very bad.      What are the causes?    This condition may be caused by:•Conditions that cause loss of water or other fluids, such as:   •Watery poop (diarrhea).      •Vomiting.      •Sweating a lot.      •Peeing (urinating) a lot.      •Not drinking enough fluids, especially when you:   •Are ill.      •Are doing things that take a lot of energy to do.        •Other illnesses and conditions, such as fever or infection.      •Certain medicines, such as medicines that take extra fluid out of the body (diuretics).      •Lack of safe drinking water.      •Not being able to get enough water and food.        What increases the risk?    The following factors may make you more likely to develop this condition:•Having a long-term (chronic) illness that has not been treated the right way, such as:  •Diabetes.      •Heart disease.      •Kidney disease.        •Being 65 years of age or older.      •Having a disability.      •Living in a place that is high above the ground or sea (high in altitude). The thinner, dried air causes more fluid loss.      •Doing exercises that put stress on your body for a long time.        What are the signs or symptoms?    Symptoms of dehydration depend on how bad it is.    Mild or worse dehydration     •Thirst.      •Dry lips or dry mouth.      •Feeling dizzy or light-headed, especially when you stand up from sitting.      •Muscle cramps.    •Your body making:  •Dark pee (urine). Pee may be the color of tea.      •Less pee than normal.      •Less tears than normal.        •Headache.      Very bad dehydration   •Changes in skin. Skin may:  •Be cold to the touch (clammy).      •Be blotchy or pale.      •Not go back to normal right after you lightly pinch it and let it go.        •Little or no tears, pee, or sweat.    •Changes in vital signs, such as:  •Fast breathing.      •Low blood pressure.      •Weak pulse.      •Pulse that is more than 100 beats a minute when you are sitting still.      •Other changes, such as:  •Feeling very thirsty.      •Eyes that look hollow (sunken).      •Cold hands and feet.      •Being mixed up (confused).      •Being very tired (lethargic) or having trouble waking from sleep.      •Short-term weight loss.      •Loss of consciousness.          How is this treated?    Treatment for this condition depends on how bad it is. Treatment should start right away. Do not wait until your condition gets very bad. Very bad dehydration is an emergency. You will need to go to a hospital.•Mild or worse dehydration can be treated at home. You may be asked to:  •Drink more fluids.      •Drink an oral rehydration solution (ORS). This drink helps get the right amounts of fluids and salts and minerals in the blood (electrolytes).      •Very bad dehydration can be treated:  •With fluids through an IV tube.      •By getting normal levels of salts and minerals in your blood. This is often done by giving salts and minerals through a tube. The tube is passed through your nose and into your stomach.      •By treating the root cause.          Follow these instructions at home:    Oral rehydration solution     If told by your doctor, drink an ORS:  •Make an ORS. Use instructions on the package.      •Start by drinking small amounts, about ½ cup (120 mL) every 5–10 minutes.      •Slowly drink more until you have had the amount that your doctor said to have.        Eating and drinking                    •Drink enough clear fluid to keep your pee pale yellow. If you were told to drink an ORS, finish the ORS first. Then, start slowly drinking other clear fluids. Drink fluids such as:  •Water. Do not drink only water. Doing that can make the salt (sodium) level in your body get too low.      •Water from ice chips you suck on.      •Fruit juice that you have added water to (diluted).      •Low-calorie sports drinks.      •Eat foods that have the right amounts of salts and minerals, such as:  •Bananas.      •Oranges.      •Potatoes.      •Tomatoes.      •Spinach.        •Do not drink alcohol.    •Avoid:•Drinks that have a lot of sugar. These include:   •High-calorie sports drinks.      •Fruit juice that you did not add water to.      •Soda.      •Caffeine.        •Foods that are greasy or have a lot of fat or sugar.        General instructions    •Take over-the-counter and prescription medicines only as told by your doctor.      •Do not take salt tablets. Doing that can make the salt level in your body get too high.      •Return to your normal activities as told by your doctor. Ask your doctor what activities are safe for you.      •Keep all follow-up visits as told by your doctor. This is important.        Contact a doctor if:  •You have pain in your belly (abdomen) and the pain:  •Gets worse.      •Stays in one place.        •You have a rash.      •You have a stiff neck.      •You get angry or annoyed (irritable) more easily than normal.      •You are more tired or have a harder time waking than normal.    •You feel:  •Weak or dizzy.      •Very thirsty.          Get help right away if you have:    •Any symptoms of very bad dehydration.    •Symptoms of vomiting, such as:  •You cannot eat or drink without vomiting.      •Your vomiting gets worse or does not go away.      •Your vomit has blood or green stuff in it.        •Symptoms that get worse with treatment.      •A fever.      •A very bad headache.    •Problems with peeing or pooping (having a bowel movement), such as:  •Watery poop that gets worse or does not go away.      •Blood in your poop (stool). This may cause poop to look black and tarry.      •Not peeing in 6–8 hours.      •Peeing only a small amount of very dark pee in 6–8 hours.        •Trouble breathing.      These symptoms may be an emergency. Do not wait to see if the symptoms will go away. Get medical help right away. Call your local emergency services (911 in the [place] Do not drive yourself to the hospital.       Summary    •Dehydration is a condition in which there is not enough water or other fluids in the body. This happens when a person loses more fluids than he or she takes in.      •Treatment for this condition depends on how bad it is. Treatment should be started right away. Do not wait until your condition gets very bad.      •Drink enough clear fluid to keep your pee pale yellow. If you were told to drink an oral rehydration solution (ORS), finish the ORS first. Then, start slowly drinking other clear fluids.      •Take over-the-counter and prescription medicines only as told by your doctor.       •Get help right away if you have any symptoms of very bad dehydration.      This information is not intended to replace advice given to you by your health care provider. Make sure you discuss any questions you have with your health care provider.      Document Revised: 07/30/2020 Document Reviewed: 07/30/2020    Else[employer] Patient Education © 2022 [employer]

## 2022-07-24 NOTE — ED ADULT NURSE NOTE - NSIMPLEMENTINTERV_GEN_ALL_ED
Implemented All Universal Safety Interventions:  English to call system. Call bell, personal items and telephone within reach. Instruct patient to call for assistance. Room bathroom lighting operational. Non-slip footwear when patient is off stretcher. Physically safe environment: no spills, clutter or unnecessary equipment. Stretcher in lowest position, wheels locked, appropriate side rails in place.

## 2022-07-24 NOTE — ED PROVIDER NOTE - CARE PLAN
1 Principal Discharge DX:	Dizziness  Secondary Diagnosis:	Nausea and vomiting  Secondary Diagnosis:	Diarrhea

## 2022-07-24 NOTE — ED ADULT NURSE NOTE - OBJECTIVE STATEMENT
Pt presents to ED c/o lightheadedness, body aches and nausea/vomiting since today. States "I had covid 1 week ago, and I was feeling better but today im just feeling lousy again". Denies chest pain, dizziness, shortess of breath, fevers. Pt A&Ox4, ambulatory with steady gait, speaking in clear/full sentences, no acute distress, vital signs stable.

## 2022-07-24 NOTE — ED ADULT TRIAGE NOTE - PATIENT ON (OXYGEN DELIVERY METHOD)
SUBJECTIVE:                                                      Pascale Rodriguez is a 4 month old female, here for a routine health maintenance visit.    Patient was roomed by: Debbie Garrido    [facility]     Social History  Patient accompanied by:  Mother and sister  Questions or concerns?: YES (1. Sleeping change)    Forms to complete? YES  Child lives with::  Mother, father and sister  Who takes care of your child?:  Home with family member, father, mother and paternal grandmother  Languages spoken in the home:  English  Recent family changes/ special stressors?:  None noted    Safety / Health Risk  Is your child around anyone who smokes?  No    TB Exposure:     No TB exposure    Car seat < 6 years old, in  back seat, rear-facing, 5-point restraint? Yes    Home Safety Survey:      Firearms in the home?: No      Hearing / Vision  Hearing or vision concerns?  No concerns, hearing and vision subjectively normal    Daily Activities    Water source:  City water and filtered water  Nutrition:  Formula  Formula:  Nutramigen  Vitamins & Supplements:  No    Elimination       Urinary frequency:more than 6 times per 24 hours     Stool frequency: 4-6 times per 24 hours     Stool consistency: soft     Elimination problems:  Diarrhea    Sleep      Sleep arrangement:bassinet and crib    Sleep position:  On back    Sleep pattern: SLEEPS THROUGH NIGHT        PROBLEM LIST  Patient Active Problem List   Diagnosis     Normal  (single liveborn)     Infant of a diabetic mother (IDM)     Formula intolerance     Gastroesophageal reflux disease without esophagitis     MEDICATIONS  Current Outpatient Prescriptions   Medication Sig Dispense Refill     order for DME Equipment being ordered: Lorenzo Sling 1 each 0     Infant Foods (NUTRAMIGEN) POWD Use for feed on demand. 3 each 3      ALLERGY  No Known Allergies    IMMUNIZATIONS  Immunization History   Administered Date(s) Administered     DTAP-IPV/HIB (PENTACEL) 2017      "Hepatitis B 2017, 2017     Pneumococcal (PCV 13) 2017     Rotavirus, monovalent, 2-dose 2017       HEALTH HISTORY SINCE LAST VISIT  No surgery, major illness or injury since last physical exam    Today's concerns:  1. At last visit, mom was concerned that she was gasping for air 4-5 times per day. Also was spitting up. This is better. Switched to Nutramigin in February after projectile vomiting.   Mom says she is spitting up often even if it has been hours since she last ate.   Mom asks if she can start cereal in formula. Reports patient seems interested in foods.     2. Reports that she used to sleep 10p-7a, now sleeping 10p-5a. Seems like ready to start solids.     3. Went to dermatologist in [place] to discuss removal of hemangioma on her right cheek. Doesn't remember the name of the dermatologist. Did not recommend any treatment because it would be \"traumatic\". Also recommended waiting until the hemangioma was the size of a dime before doing anything. Mom was not happy with this.     Mom has not returned to work at [employer]. Dad now working for [employer].       DEVELOPMENT  Milestones (by observation/ exam/ report. 75-90% ile):     PERSONAL/ SOCIAL/COGNITIVE:    Regards face    Smiles responsively   LANGUAGE:    Vocalizes    Responds to sound  GROSS MOTOR:    Lift head when prone    Kicks / equal movements  FINE MOTOR/ ADAPTIVE:    Eyes follow past midline    Reflexive grasp      ROS  GENERAL: See health history, nutrition and daily activities   SKIN: See Health History, hemangioma  HEENT: Hearing/vision: see above.  No eye, nasal, ear symptoms.  RESP: No cough or other concens  CV:  No concerns  GI: See Health History and spitting up  : See elimination. No concerns.  NEURO: See development    This document serves as a record of the services and decisions personally performed and made by Silvina Ch MD. It was created on his/her behalf by Mary Beth Nuno, a trained medical scribe. The " "creation of this document is based the provider's statements to the medical scribe.  Everett Nuno 2:58 PM, May 30, 2017      OBJECTIVE:                                                    EXAM  Pulse 147  Temp 97.2  F (36.2  C) (Axillary)  Resp 28  Ht 0.632 m (2' 0.9\")  Wt 6.549 kg (14 lb 7 oz)  HC 41.3 cm  SpO2 100%  BMI 16.37 kg/m2  70 %ile based on WHO (Girls, 0-2 years) length-for-age data using vitals from 2017.  56 %ile based on WHO (Girls, 0-2 years) weight-for-age data using vitals from 2017.  71 %ile based on WHO (Girls, 0-2 years) head circumference-for-age data using vitals from 2017.  GENERAL: Active, alert,  no  distress.  SKIN: Small raised Hemangioma on right cheek.  HEAD: Normocephalic. Normal fontanels and sutures.  EYES: Conjunctivae and cornea normal. Red reflexes present bilaterally.  EARS: normal: no effusions, no erythema, normal landmarks  NOSE: Normal without discharge.  MOUTH/THROAT: Clear. No oral lesions.  NECK: Supple, no masses.  LYMPH NODES: No adenopathy  LUNGS: Clear. No rales, rhonchi, wheezing or retractions  HEART: Regular rate and rhythm. Normal S1/S2. No murmurs. Normal femoral pulses.  ABDOMEN: Soft, non-tender, not distended, no masses or hepatosplenomegaly. Normal umbilicus and bowel sounds.   GENITALIA: Normal female external genitalia. Krishan stage I,  No inguinal herniae are present.  EXTREMITIES: Hips normal with negative Ortolani and Singh. Symmetric creases and  no deformities  NEUROLOGIC: Normal tone throughout. Normal reflexes for age    ASSESSMENT/PLAN:                                                    1. Encounter for routine child health examination w/o abnormal findings  Formula intolerance. ROLANDA- does not need antacids. Doing ok on Nutramigen.   Well child with normal growth and development.  Okay to start cereal now. Mix with formula. Will stay with Nutramigen until she is 6 months old.     - Screening Questionnaire for " Immunizations  - DTAP - HIB - IPV VACCINE, IM USE (Pentacel) [[id number]]  - PNEUMOCOCCAL CONJ VACCINE 13 VALENT IM [[id number]]  - ROTAVIRUS VACC 2 DOSE ORAL  - VACCINE ADMINISTRATION, INITIAL  - VACCINE ADMINISTRATION, EACH ADDITIONAL  - VACCINE ADMINISTRATION, NASAL/ORAL    2. Hemangioma of face  Discussed Propanol option (rather than removal as other derm told her) for facial hemangiomas and better to treat early to prevent growth. Referral provided.   - DERMATOLOGY REFERRAL      Anticipatory Guidance  The following topics were discussed:  SOCIAL / FAMILY    return to work    talk or sing to baby/ music    on stomach to play    reading to baby    sibling rivalry  NUTRITION:    solid foods introduction- okay to start now; might try regular formula again at 6 mos if does ok with other solids.     no honey before one year  HEALTH/ SAFETY:    teething    spitting up    sleep patterns    safe crib    no walkers    car seat    falls/ rolling    hot liquids/burns    sunscreen/ insect repellent      Preventive Care Plan  Immunizations     See orders in EpicCare.  I reviewed the signs and symptoms of adverse effects and when to seek medical care if they should arise.  Referrals/Ongoing Specialty care: No   See other orders in EpicCare    FOLLOW-UP:  6 month Preventive Care visit    The information in this document, created by the medical scribe for me, accurately reflects the services I personally performed and the decisions made by me. I have reviewed and approved this document for accuracy prior to leaving the patient care area.    3:15 PM, 05/30/17    Silvina Ch MD  [facility]   room air

## 2022-07-24 NOTE — ED PROVIDER NOTE - PATIENT PORTAL LINK FT
You can access the FollowMyHealth Patient Portal offered by [facility] by registering at the following website: [email or web address]. By joining [employer]’s FollowMyHealth portal, you will also be able to view your health information using other applications (apps) compatible with our system.

## 2022-07-26 LAB
CULTURE RESULTS: SIGNIFICANT CHANGE UP
SPECIMEN SOURCE: SIGNIFICANT CHANGE UP

## 2022-07-27 DIAGNOSIS — Z86.16 PERSONAL HISTORY OF COVID-19: ICD-10-CM

## 2022-07-27 DIAGNOSIS — I10 ESSENTIAL (PRIMARY) HYPERTENSION: ICD-10-CM

## 2022-07-27 DIAGNOSIS — R42 DIZZINESS AND GIDDINESS: ICD-10-CM

## 2022-07-27 DIAGNOSIS — Z88.8 ALLERGY STATUS TO OTHER DRUGS, MEDICAMENTS AND BIOLOGICAL SUBSTANCES STATUS: ICD-10-CM

## 2022-07-27 DIAGNOSIS — Z88.1 ALLERGY STATUS TO OTHER ANTIBIOTIC AGENTS STATUS: ICD-10-CM

## 2022-07-27 DIAGNOSIS — Z20.822 CONTACT WITH AND (SUSPECTED) EXPOSURE TO COVID-19: ICD-10-CM

## 2022-07-27 DIAGNOSIS — R11.2 NAUSEA WITH VOMITING, UNSPECIFIED: ICD-10-CM

## 2022-07-27 DIAGNOSIS — M06.9 RHEUMATOID ARTHRITIS, UNSPECIFIED: ICD-10-CM

## 2022-07-27 DIAGNOSIS — Z91.040 LATEX ALLERGY STATUS: ICD-10-CM

## 2022-07-27 DIAGNOSIS — R19.7 DIARRHEA, UNSPECIFIED: ICD-10-CM

## 2022-09-05 ENCOUNTER — EMERGENCY (EMERGENCY)
Facility: HOSPITAL | Age: 62
LOS: 1 days | Discharge: HOME | End: 2022-09-05
Attending: EMERGENCY MEDICINE | Admitting: EMERGENCY MEDICINE
Payer: COMMERCIAL

## 2022-09-05 VITALS
DIASTOLIC BLOOD PRESSURE: 86 MMHG | SYSTOLIC BLOOD PRESSURE: 135 MMHG | OXYGEN SATURATION: 98 % | TEMPERATURE: 98 F | RESPIRATION RATE: 16 BRPM | HEART RATE: 82 BPM

## 2022-09-05 VITALS
HEIGHT: 68 IN | HEART RATE: 88 BPM | OXYGEN SATURATION: 96 % | TEMPERATURE: 98 F | RESPIRATION RATE: 17 BRPM | WEIGHT: 227.08 LBS | SYSTOLIC BLOOD PRESSURE: 141 MMHG | DIASTOLIC BLOOD PRESSURE: 74 MMHG

## 2022-09-05 DIAGNOSIS — Z96.651 PRESENCE OF RIGHT ARTIFICIAL KNEE JOINT: ICD-10-CM

## 2022-09-05 LAB
ANION GAP SERPL CALC-SCNC: 10 MMOL/L — SIGNIFICANT CHANGE UP (ref 5–17)
BASOPHILS # BLD AUTO: 0.03 K/UL — SIGNIFICANT CHANGE UP (ref 0–0.2)
BASOPHILS NFR BLD AUTO: 0.4 % — SIGNIFICANT CHANGE UP (ref 0–2)
BUN SERPL-MCNC: 13 MG/DL — SIGNIFICANT CHANGE UP (ref 7–23)
CALCIUM SERPL-MCNC: 10 MG/DL — SIGNIFICANT CHANGE UP (ref 8.4–10.5)
CHLORIDE SERPL-SCNC: 107 MMOL/L — SIGNIFICANT CHANGE UP (ref 96–108)
CK SERPL-CCNC: 100 U/L — SIGNIFICANT CHANGE UP (ref 25–170)
CO2 SERPL-SCNC: 25 MMOL/L — SIGNIFICANT CHANGE UP (ref 22–31)
CREAT SERPL-MCNC: 0.85 MG/DL — SIGNIFICANT CHANGE UP (ref 0.5–1.3)
EGFR: 78 ML/MIN/1.73M2 — SIGNIFICANT CHANGE UP
EOSINOPHIL # BLD AUTO: 0.36 K/UL — SIGNIFICANT CHANGE UP (ref 0–0.5)
EOSINOPHIL NFR BLD AUTO: 4.2 % — SIGNIFICANT CHANGE UP (ref 0–6)
ERYTHROCYTE [SEDIMENTATION RATE] IN BLOOD: 43 MM/HR — HIGH
GLUCOSE SERPL-MCNC: 118 MG/DL — HIGH (ref 70–99)
HCT VFR BLD CALC: 32.6 % — LOW (ref 34.5–45)
HGB BLD-MCNC: 10.6 G/DL — LOW (ref 11.5–15.5)
IMM GRANULOCYTES NFR BLD AUTO: 0.4 % — SIGNIFICANT CHANGE UP (ref 0–1.5)
LYMPHOCYTES # BLD AUTO: 2.08 K/UL — SIGNIFICANT CHANGE UP (ref 1–3.3)
LYMPHOCYTES # BLD AUTO: 24.5 % — SIGNIFICANT CHANGE UP (ref 13–44)
MCHC RBC-ENTMCNC: 28.7 PG — SIGNIFICANT CHANGE UP (ref 27–34)
MCHC RBC-ENTMCNC: 32.5 GM/DL — SIGNIFICANT CHANGE UP (ref 32–36)
MCV RBC AUTO: 88.3 FL — SIGNIFICANT CHANGE UP (ref 80–100)
MONOCYTES # BLD AUTO: 0.67 K/UL — SIGNIFICANT CHANGE UP (ref 0–0.9)
MONOCYTES NFR BLD AUTO: 7.9 % — SIGNIFICANT CHANGE UP (ref 2–14)
NEUTROPHILS # BLD AUTO: 5.33 K/UL — SIGNIFICANT CHANGE UP (ref 1.8–7.4)
NEUTROPHILS NFR BLD AUTO: 62.6 % — SIGNIFICANT CHANGE UP (ref 43–77)
NRBC # BLD: 0 /100 WBCS — SIGNIFICANT CHANGE UP (ref 0–0)
PLATELET # BLD AUTO: 294 K/UL — SIGNIFICANT CHANGE UP (ref 150–400)
POTASSIUM SERPL-MCNC: 3.7 MMOL/L — SIGNIFICANT CHANGE UP (ref 3.5–5.3)
POTASSIUM SERPL-SCNC: 3.7 MMOL/L — SIGNIFICANT CHANGE UP (ref 3.5–5.3)
RBC # BLD: 3.69 M/UL — LOW (ref 3.8–5.2)
RBC # FLD: 15 % — HIGH (ref 10.3–14.5)
SODIUM SERPL-SCNC: 142 MMOL/L — SIGNIFICANT CHANGE UP (ref 135–145)
WBC # BLD: 8.5 K/UL — SIGNIFICANT CHANGE UP (ref 3.8–10.5)
WBC # FLD AUTO: 8.5 K/UL — SIGNIFICANT CHANGE UP (ref 3.8–10.5)

## 2022-09-05 PROCEDURE — 73562 X-RAY EXAM OF KNEE 3: CPT

## 2022-09-05 PROCEDURE — 85025 COMPLETE CBC W/AUTO DIFF WBC: CPT

## 2022-09-05 PROCEDURE — 99284 EMERGENCY DEPT VISIT MOD MDM: CPT | Mod: 25

## 2022-09-05 PROCEDURE — 87040 BLOOD CULTURE FOR BACTERIA: CPT

## 2022-09-05 PROCEDURE — 82550 ASSAY OF CK (CPK): CPT

## 2022-09-05 PROCEDURE — 36415 COLL VENOUS BLD VENIPUNCTURE: CPT

## 2022-09-05 PROCEDURE — 73562 X-RAY EXAM OF KNEE 3: CPT | Mod: 26,RT

## 2022-09-05 PROCEDURE — 96374 THER/PROPH/DIAG INJ IV PUSH: CPT

## 2022-09-05 PROCEDURE — 96375 TX/PRO/DX INJ NEW DRUG ADDON: CPT

## 2022-09-05 PROCEDURE — 85652 RBC SED RATE AUTOMATED: CPT

## 2022-09-05 PROCEDURE — 80048 BASIC METABOLIC PNL TOTAL CA: CPT

## 2022-09-05 RX ORDER — CEFAZOLIN SODIUM 1 G
1000 VIAL (EA) INJECTION ONCE
Refills: 0 | Status: COMPLETED | OUTPATIENT
Start: 2022-09-05 | End: 2022-09-05

## 2022-09-05 RX ORDER — CEPHALEXIN 500 MG
1 CAPSULE ORAL
Qty: 28 | Refills: 0
Start: 2022-09-05 | End: 2022-09-11

## 2022-09-05 RX ORDER — DIPHENHYDRAMINE HCL 50 MG
25 CAPSULE ORAL ONCE
Refills: 0 | Status: COMPLETED | OUTPATIENT
Start: 2022-09-05 | End: 2022-09-05

## 2022-09-05 RX ADMIN — Medication 25 MILLIGRAM(S): at 19:40

## 2022-09-05 RX ADMIN — Medication 100 MILLIGRAM(S): at 19:40

## 2022-09-05 NOTE — ED ADULT NURSE REASSESSMENT NOTE - NS ED NURSE REASSESS COMMENT FT1
Pt received in no acute distress, a&ox4, breathing with ease on room air. Pt with 20g heplock to LAC, intact, no edema or redness noted, antibiotics completed. Pt noted with healing incision to L knee, no erythema or edema noted. No foul odor. Pt c/o drainage at times. Pt pending all lab results and md dispo. Pt otherwise comfortable and assisted with all needs. Given water as requested. Will monitor.

## 2022-09-05 NOTE — ED PROVIDER NOTE - NSFOLLOWUPINSTRUCTIONS_ED_ALL_ED_FT
Take benadryl 25mg every 6 hours as needed for itching and antibiotics as prescribed to help treat possible infection. Your redness may be a reaction to the wound glue from your surgery. Please see your orthopedic doctor tomorrow for recheck of your wound.  Call for appointment.  If you have any problems with followup, please call the ED Referral Coordinator at [phone].  Return to the ER if symptoms worsen or other concerns.    Cellulitis    WHAT YOU NEED TO KNOW:    Cellulitis is a skin infection caused by bacteria. Cellulitis may go away on its own or you may need treatment. Your healthcare provider may draw a [place] around the outside edges of your cellulitis. If your cellulitis spreads, your healthcare provider will see it outside of the [place]. Cellulitis          DISCHARGE INSTRUCTIONS:    Call 911 if:     You have sudden trouble breathing or chest pain.        Return to the emergency department if:     Your wound gets larger and more painful.       You feel a crackling under your skin when you touch it.      You have purple dots or bumps on your skin, or you see bleeding under your skin.      You have new swelling and pain in your legs.      The red, warm, swollen area gets larger.      You see red streaks coming from the infected area.    Contact your healthcare provider if:     You have a fever.      Your fever or pain does not go away or gets worse.      The area does not get smaller after 2 days of antibiotics.      Your skin is flaking or peeling off.      You have questions or concerns about your condition or care.    Medicines:     Antibiotics help treat the bacterial infection.       NSAIDs, such as ibuprofen, help decrease swelling, pain, and fever. NSAIDs can cause stomach bleeding or kidney problems in certain people. If you take blood thinner medicine, always ask if NSAIDs are safe for you. Always read the medicine label and follow directions. Do not give these medicines to children under 6 months of age without direction from your child's healthcare provider.      Acetaminophen decreases pain and fever. It is available without a doctor's order. Ask how much to take and how often to take it. Follow directions. Read the labels of all other medicines you are using to see if they also contain acetaminophen, or ask your doctor or pharmacist. Acetaminophen can cause liver damage if not taken correctly. Do not use more than 4 grams (4,000 milligrams) total of acetaminophen in one day.       Take your medicine as directed. Contact your healthcare provider if you think your medicine is not helping or if you have side effects. Tell him or her if you are allergic to any medicine. Keep a list of the medicines, vitamins, and herbs you take. Include the amounts, and when and why you take them. Bring the list or the pill bottles to follow-up visits. Carry your medicine list with you in case of an emergency.    Self-care:     Elevate the area above the level of your heart as often as you can. This will help decrease swelling and pain. Prop the area on pillows or blankets to keep it elevated comfortably.       Clean the area daily until the wound scabs over. Gently wash the area with soap and water. Pat dry. Use dressings as directed.       Place cool or warm, wet cloths on the area as directed. Use clean cloths and clean water. Leave it on the area until the cloth is room temperature. Pat the area dry with a clean, dry cloth. The cloths may help decrease pain.     Prevent cellulitis:     Do not scratch bug bites or areas of injury. You increase your risk for cellulitis by scratching these areas.       Do not share personal items, such as towels, clothing, and razors.       Clean exercise equipment with germ-killing  before and after you use it.      Wash your hands often. Use soap and water. Wash your hands after you use the bathroom, change a child's diapers, or sneeze. Wash your hands before you prepare or eat food. Use lotion to prevent dry, cracked skin. Handwashing           Wear pressure stockings as directed. You may be told to wear the stockings if you have peripheral edema. The stockings improve blood flow and decrease swelling.      Treat athlete’s foot. This can help prevent the spread of a bacterial skin infection.    Follow up with your healthcare provider within 3 days, or as directed: Your healthcare provider will check if your cellulitis is getting better. You may need different medicine. Write down your questions so you remember to ask them during your visits.

## 2022-09-05 NOTE — ED PROVIDER NOTE - OBJECTIVE STATEMENT
denies pmh, here with redness/itching of right knee for past several days. Had knee replacement [facility] 8 days ago. Notes some mild drainage. Denies fever/chills. Took one benadryl for itching without improvement. Able to range knee normally. Hasn't followed up with her surgeon yet.

## 2022-09-05 NOTE — ED PROVIDER NOTE - PATIENT PORTAL LINK FT
You can access the FollowMyHealth Patient Portal offered by [facility] by registering at the following website: [email or web address]. By joining [facility]’s FollowMyHealth portal, you will also be able to view your health information using other applications (apps) compatible with our system.

## 2022-09-05 NOTE — ED PROVIDER NOTE - MUSCULOSKELETAL, MLM
right knee anterior incision intact with surrounding erythema, some yellow crusting, a few fluid filled vesicles. no tenderness/warmth. able to range knee normally without pain.

## 2022-09-05 NOTE — ED ADULT NURSE NOTE - NSIMPLEMENTINTERV_GEN_ALL_ED
Implemented All Fall Risk Interventions:  [place] to call system. Call bell, personal items and telephone within reach. Instruct patient to call for assistance. Room bathroom lighting operational. Non-slip footwear when patient is off stretcher. Physically safe environment: no spills, clutter or unnecessary equipment. Stretcher in lowest position, wheels locked, appropriate side rails in place. Provide visual cue, wrist band, yellow gown, etc. Monitor gait and stability. Monitor for mental status changes and reorient to person, place, and time. Review medications for side effects contributing to fall risk. Reinforce activity limits and safety measures with patient and family.

## 2022-09-05 NOTE — ED ADULT NURSE NOTE - OBJECTIVE STATEMENT
Pt s/p right knee replacement 9/3 presents w/ itching, redness, swelling to right knee around incision site. PT ambulates in ED with rollator.

## 2022-09-05 NOTE — ED PROVIDER NOTE - CLINICAL SUMMARY MEDICAL DECISION MAKING FREE TEXT BOX
redness/dermatitis redness/dermatitis of knee in area of recent surgery/incision. suspect allergic reaction to dermabond but concern for possible infection/ cellulitis. labs/xray ordered. ancef/benadryl given. reports improvement post meds. no fever, wbc wnl. will dc with keflex, f/u with her ortho at [facility] tomorrow. return precautions discussed

## 2022-09-08 DIAGNOSIS — L76.82 OTHER POSTPROCEDURAL COMPLICATIONS OF SKIN AND SUBCUTANEOUS TISSUE: ICD-10-CM

## 2022-09-08 DIAGNOSIS — Z91.040 LATEX ALLERGY STATUS: ICD-10-CM

## 2022-09-08 DIAGNOSIS — L53.9 ERYTHEMATOUS CONDITION, UNSPECIFIED: ICD-10-CM

## 2022-09-08 DIAGNOSIS — Z88.1 ALLERGY STATUS TO OTHER ANTIBIOTIC AGENTS STATUS: ICD-10-CM

## 2022-09-08 DIAGNOSIS — M06.9 RHEUMATOID ARTHRITIS, UNSPECIFIED: ICD-10-CM

## 2022-09-08 DIAGNOSIS — I10 ESSENTIAL (PRIMARY) HYPERTENSION: ICD-10-CM

## 2022-09-08 DIAGNOSIS — L30.9 DERMATITIS, UNSPECIFIED: ICD-10-CM

## 2022-09-11 LAB
CULTURE RESULTS: SIGNIFICANT CHANGE UP
CULTURE RESULTS: SIGNIFICANT CHANGE UP
SPECIMEN SOURCE: SIGNIFICANT CHANGE UP
SPECIMEN SOURCE: SIGNIFICANT CHANGE UP

## 2022-10-24 NOTE — ED ADULT NURSE NOTE - NSICDXNOFAMILYHX_GEN_ALL_CORE
October 26, 2022       Ann Groves,   [address] [facility] [address]  Via In Basket      Patient: Roopa Kingston   YOB: 1998   Date of Visit: 10/24/2022       Dear Dr. Groves:    I saw your patient, Roopa Kingston, for an evaluation. Below are my notes for this visit with her.    If you have questions, please do not hesitate to call me.      Sincerely,        Tony Negrete MD        CC: No Recipients  Tony Negrete MD  10/26/2022  3:48 PM  Signed  Assessment and Plan:  Roopa Kingston is a 24 year old female with no significant medical history, presents today in office for a follow-up for hyperthyroidism.  Patient has given consent to record this visit for documentation in their clinical record.     Hyperthyroidism:  Lab Results   Component Value Date    TSH 4.200 10/12/2022    TSH 0.194 (L) 09/01/2022    T4FREE 0.9 10/12/2022    T4FREE 0.7 (L) 09/01/2022    FT3 2.7 10/12/2022    FT3 2.5 09/01/2022    TSIG 2.69 (H) 07/21/2022    TSHRECEPTRAB 4.20 (H) 07/21/2022    ATHYRTAC <0.9 07/21/2022    AMICR 2,802 (H) 07/21/2022    GPT 20 10/12/2022    GPT 23 09/01/2022    AST 21 10/12/2022    AST 20 09/01/2022    BILIRUBIN 1.5 (H) 10/12/2022    BILIRUBIN 1.7 (H) 09/01/2022   Reviewed and discussed previous lab reports.  The TSH level is normal at 4.2 on 10/12/2022 and was low previously.  The free T4 level is normal checked on 10/12/2022.  The bilirubin level is slightly high and assured not to worry about it.  Ordered comprehensive metabolic panel and TSH reflex to be done a week prior to the next visit.  Decrease Methimazole 5 mg to six days a week.  Discussed the chest pain can be related to costochondritis.  Advised to stop doing bench press exercise at gym for chest pain. Rationale explained.  Follow up in six weeks with labs to be done prior to the next visit.    Androgenic alopecia  Start Spironolactone 25 mg twice daily. Reviewed the side effects.  Discussed hair fall and other symptoms are  not related to hyperthyroidism as her TSH levels are normal.  Discussed the hair fall can be related to elevated dihydrotestosterone levels.  Will continue to monitor.    Chief Complaint   Patient presents with   • Follow-up     hyperthyroidism   • Office Visit     HPI:  Roopa Kingston is a 24 year old female with no significant medical history, presents today in office for a follow-up for hyperthyroidism.    She has a history of hyperthyroidism. She is on Methimazole 5 mg once daily and is compliant it.  She complains of hair fall and chest pain more often especially after lifting weight at gym. She also reports more sweating and palpitations occasionally. She usually sleeps on her right side as she has difficulty in breathing while sleeping on the left side. She has fluctuating pulse rate. She has regular menstrual cycle. She denies excess hair growth on face. She does weight lifting and cardio in the gym. She goes to gym four days a week.    Denies symptoms of COVID-19 including fever, cough, dyspnea, and denies any recent travels. Patient denies having come in contact with anyone who has tested positive.    OBJECTIVE:      Current Outpatient Medications   Medication Sig Dispense Refill   • methIMAzole (TAPAZOLE) 5 MG tablet 1 tab daily 6 days a week 30 tablet 3   • spironolactone (ALDACTONE) 25 MG tablet Take 1 tablet by mouth 2 times daily. 60 tablet 3     No current facility-administered medications for this visit.     PROBLEM LIST:    Patient Active Problem List   Diagnosis   • Hyperthyroidism   • Androgenic alopecia       HISTORIES:    Past Medical History:    No known problems                                           Past Surgical History:   Procedure Laterality Date   • No past surgeries       Family History   Problem Relation Age of Onset   • Heart disease Father    • Patient is unaware of any medical problems Mother        Social History:  Social History     Socioeconomic History   • Marital status:  Single     Spouse name: Not on file   • Number of children: Not on file   • Years of education: Not on file   • Highest education level: Not on file   Occupational History   • Not on file   Tobacco Use   • Smoking status: Never Smoker   • Smokeless tobacco: Never Used   Vaping Use   • Vaping Use: never used   Substance and Sexual Activity   • Alcohol use: Yes     Comment: socialy   • Drug use: Yes     Types: Marijuana   • Sexual activity: Yes     Birth control/protection: None   Other Topics Concern   • Not on file   Social History Narrative   • Not on file     Social Determinants of Health     Financial Resource Strain: Not on file   Food Insecurity: Not on file   Transportation Needs: Not on file   Physical Activity: Not on file   Stress: Not on file   Social Connections: Not on file   Intimate Partner Violence: Not on file     ALLERGIES:  No Known Allergies      Review of Systems   Constitutional: Positive for diaphoresis. Negative for fever.   Respiratory: Negative for cough and shortness of breath.    Cardiovascular: Positive for chest pain and palpitations.   Genitourinary: Negative for menstrual problem.   Skin:        Positive for hair fall.  Negative for itching or burning in the scalp.  Negative for excess hair growth on face.   All other systems reviewed and are negative.      Visit Vitals  /86   Pulse 86   Resp 16   Ht 5' 5\"   Wt 59 kg (130 lb 1.1 oz)   LMP 05/23/2022   BMI 21.64 kg/m²     Body mass index is 21.64 kg/m².    WEIGHTS:   Wt Readings from Last 4 Encounters:   10/24/22 59 kg (130 lb 1.1 oz)   09/08/22 58 kg (127 lb 13.9 oz)   07/21/22 57 kg (125 lb 10.6 oz)   06/03/22 56.2 kg (124 lb)     P/E:  General: Wearing a face mask  HEENT: EOMI, No exophthalmos, no palpable Lymph node, thinning of hair noted on the scalp.  Neck: No visible thyroid enlargement or palpable Thyroid Nodule  Chest: No labor respiration, Clear to Auscultation  CVS: Tachycardia noted  Abdomen: Not distended abdomen    Neurologic: Pt is awake, alert, oriented x 3  MSK: Moves all extremities   Skin: No visible rash  Pysch: Pleasant  Speech: No dysarthria    LABORATORY DATA:    No results found for: HGBA1C  No components found for: CHOLHDL  Triglycerides (mg/dL)   Date Value   06/03/2022 46     HDL (mg/dL)   Date Value   06/03/2022 59     LDL (mg/dL)   Date Value   06/03/2022 47     No components found for: NONHDLCALC  Sodium (mmol/L)   Date Value   06/03/2022 141     Potassium (mmol/L)   Date Value   06/03/2022 3.9     Chloride (mmol/L)   Date Value   06/03/2022 109     Glucose (mg/dL)   Date Value   06/03/2022 86     Calcium (mg/dL)   Date Value   06/03/2022 9.4     Carbon Dioxide (mmol/L)   Date Value   06/03/2022 24     BUN (mg/dL)   Date Value   06/03/2022 17     Creatinine (mg/dL)   Date Value   06/03/2022 0.52     TSH (mcUnits/mL)   Date Value   10/12/2022 4.200     WBC (K/mcL)   Date Value   06/03/2022 7.6     RBC (mil/mcL)   Date Value   06/03/2022 4.78     HCT (%)   Date Value   06/03/2022 44.5     HGB (g/dL)   Date Value   06/03/2022 14.7     PLT (K/mcL)   Date Value   06/03/2022 285     GOT/AST (Units/L)   Date Value   10/12/2022 21     GPT/ALT (Units/L)   Date Value   10/12/2022 20     No results found for: GGTP  Alkaline Phosphatase (Units/L)   Date Value   10/12/2022 64     Bilirubin, Total (mg/dL)   Date Value   10/12/2022 1.5 (H)       Thank you Ann Mac DO for allowing me to participate in the care of the patient and for Consultation. If any of the questions are unanswered please don't hesitate to give me a call.      Luzmaria Negrete MD, F.A.C.E;F.A.C.P   Associate Professor of Medicine, [facility]   Staff Endocrinologist, [facility]/[place], [place].   10/24/2022    I,  Dr. Rosa Muñoz, have created a visit summary document based on the audio recording between Dr. Tony Negrete MD and this patient for the physician to review, edit as needed, and authenticate.   Creation Date: 10/24/2022         Provider attestation:   The documentation recorded by the scribe accurately and completely reflects the service(s) I personally performed and the decisions made by me.                <-- Click to add NO pertinent Family History

## 2022-12-29 NOTE — ED ADULT NURSE NOTE - PRIMARY CARE PROVIDER
Crys Steel comes into clinic today at the request of Dr. Early Ordering Provider for [facility]    This service provided today was under the supervising provider of the day Dr. Kilgore, who was available if needed.    Cornelia Esteban RN      [facility] Phototherapy Record  1. Crys Steel is a 25 year old female is here today for phototherapy (UVB) treatment for atopic dermatis.        Changes or new medications since last treatment (If yes, notify MD):  NO    New medical conditions (If yes, notify MD):  NO    Any problems with last phototherapy treatment (If yes, notify MD)?  NO    Patient denies any remaining skin redness since last treatment (If no, do not treat. Do not treat red skin):  YES    Did staff apply any topicals on patient?  NO  If yes, which topical?      Did patient self apply any topicals?  NO  If yes, which topical?        2. The patient tolerated phototherapy without complication.    Patient will return per protocol for next UVB treatment, per protocol.     Patient to see provider every 4-12 weeks for follow-up during treatment (if no, notify treating physician):  YES.     All questions and concerns discussed with patient in clinic today.    Cornelia Esteban RN    
unaffiliated

## 2023-01-17 NOTE — ED ADULT NURSE NOTE - NURSING NEURO ORIENTATION
Clinic Care Coordination Contact  [facility]/Voicemail       Clinical Data: Care Coordinator Outreach  Outreach attempted x 2.  Left message on patient's voicemail with call back information and requested return call.    Plan: Care Coordinator will do no further outreaches at this time.    ULCERO Chopra  Connected Care Resource Center  [facility]     *Connected Care Resource Team does NOT follow patient ongoing. Referrals are identified based on internal discharge reports and the outreach is to ensure patient has an understanding of their discharge instructions.     oriented to person, place and time

## 2023-06-22 ENCOUNTER — APPOINTMENT (OUTPATIENT)
Dept: OPHTHALMOLOGY | Facility: CLINIC | Age: 63
End: 2023-06-22

## 2023-11-30 NOTE — ED ADULT NURSE NOTE - NS ED NURSE DISCH DISPOSITION
DATE OF SURGERY: 11/30/2023    PREOPERATIVE DIAGNOSIS: Prostate cancer.     POSTOPERATIVE DIAGNOSIS: Prostate cancer.    PATHOLOGICAL DIAGNOSIS: Pending.     PROCEDURE:   1. Robotic-assisted laparoscopic radical prostatectomy.   2. Right and left Pelvic lymphadenectomy.    3. Anterior urethropexy    OPERATIVE FINDINGS:   1. Nerve spare  2.  No gross lymphadenopathy  3. Fisher to gravity drainage at end of case.    FOLLOWUP: The patient will be admitted to the hospital for monitoring and recovery.         SURGEON: Patrice Chao MD    ASSISTANT:  No qualified resident was available to assist at bedside with this case.  Clau Sunshine Certified first assistants         ANESTHESIA: General endotracheal.     CLINICAL HISTORY: The patient was noted to have an elevated PSA. He underwent a prostate biopsy that revealed prostate cancer. He was given all of his treatment options and elected to undergo a robotic-assisted radical prostatectomy. He was brought to the operating room today for this procedure.     OPERATIVE PROCEDURE: The patient was brought to the operating room, and after identification by name and number, was placed supine on the operating table. General endotracheal anesthesia was administered. The patient's perineum, abdomen, and groin were prepped and draped in the usual sterile manner. An 20 Moroccan Fisher catheter was placed and left to gravity drainage.     The peritoneum was then accessed via A Veress needle at midline supraumbilically. This was placed and verified to be in the abdomen with normal saline drop test. The abdomen was insufflated to a pressure of 15mmHg. Once the abdomen was well-insufflated, an 5 mm visiport was placed  adjacent to the Veress needle insertion due to prior gastric sleeve and small bowel obstruction history. Examination of the interior of the abdomen revealed appropriate intraperitoneal positioning. This 5mm port was exchanged for an 8mm robotic trocar.  A  series of robotic ports were then placed in the standard configuration approximately 8 cm away from one another including two assistant ports were placed on the patients left. Due to omental adhesions, an additional 15 minutes were spent releasing this tissue from the anterior abdominal wall.  He was then put into a steep Trendelenburg position.     The robot was then docked, and all the arms were looked in under direct vision. Dissection of adhesions was performed along the lateral pelvic wall next to the sigmoid colon, and the bowel was allowed to retract superiorly. The bladder was identified. An incision was made through the peritoneum. The seminal vesicles were dissected out posteriorly taking care to minimize cautery especially around the tips of the seminal vesicles. The vas deferens were dissected and transected. A limited posterior dissection was made through denonvieller's fascia. Next the bladder was dropped revealing the space of retzius. There periprostatic tissue was removed to reveal the interposition between the bladder neck and the prostate, sent for pathologic analysis. The endopelvic fascia was incised away from the pubic bone and bilaterally down the side of the prostate to the bladder neck. The puboprostatic ligaments were then cut, and a 0 vloc suture on a CT1 needle used to ligate the dorsal venous complex and tied down. Good venous control was noted. An anterior urethropexy was then performed by bringing the dorsal venous stitch through the pubic bone twice. In an effort to promote early return of continence post prostatectomy, an anterior urethropexy was then performed by bringing the dorsal venous stitch through the pubic bone twice to restore the anterior attachment of the periurethral tissues to the pubic bone which is lost during the aforementioned transection of the puboprostatic ligaments and dorsal venous complex.The bladder neck was identified with the Fisher catheter, and dissection  down through the anterior bladder neck was performed followed by the dissection of the posterior bladder neck. The seminal vesicles and vas were delivered from posterior to anterior position. The pedicles were ligated with bipolar cautery and ligasure. A 3-0 vicryl was used to oversew any bleeding areas.  The neurovascular bundles were identified bilaterally, and the lateral fascia holding them was released away from the prostate. Bipolar cautery and sharp dissection were then used to allow them to drop completely away from the prostatic capsule. At the end of the case, the neurovascular bundles were noted to be intact bilaterally.       Once the neurovascular bundles were dissected toward the apex, attention was turned back to the anterior portion of the prostate. The dorsal venous complex was divided with electrocautery, and good hemostasis was noted.  The anterior urethra was opened, revealing the Fisher catheter. The Fisher catheter was removed, and the posterior urethra was cut sharply across its back and then carefully  away from the perirectal tissue with sharp dissection. The entire posterior aspect of the prostate was  sharply away from the tissue. Care was taken not to compromise the rectum. The prostate was released  and it was placed in an Endo Catch bag for later removal.  Surgicel sheets were placed in the neurovascular bundles. The lymph nodes were examined bilaterally.No gross lymphadenopathy was noted, due to extent of disease  bilateral pelvic node dissection was performed. The lymph packets were  away from the external iliac vessels and obturator nerve on each side. No gross lymphadenopathy was noted, due to extent of disease  right pelvic node dissection was performed. The lymph packets were  away from the external iliac vessels and obturator nerve on each side. No gross lymphadenopathy was noted on the left, due to extent of disease  left pelvic node dissection  was performed. The lymph packets were  away from the external iliac vessels and obturator nerve on each side.The distal portion of each packet was clipped with a hemolock clip. The specimens were sent to pathology for further analysis labeled as left and right pelvic lymph nodes taking care to seal all lymphatic vessels with a combination of clips and electrocautery. Surgicel was placed within the lymph node dissection wound beds after hemostasis was achieved. Once this was performed, vesicourethral anastomosis was started.   I decide a bladder neck margin would be necessary, sent off for pathology.   A double-armed vicryl suture on UR 6 was brought into the field, and a running anastomosis of the bladder neck to the urethral stump was then performed.  The final 18 fr catheter was placed without a problem, and the bladder was filled with 100 mL of normal saline. There was   no evidence of a leak. Floseal was placed around the anastomosis for added hemostasis.   A drain was placed in the right lower quadrant to remove post operative fluid accumulation with plans for removal tomorrow.  The robot was undocked, and the camera was returned into the peritoneum through the assistant port. The Endo Catch bag was grasped and pulled out of the midline 12 mm port. The prostate was then brought up through the central port by extending the incision. The 12mm assistant port was closed with a port closure device under direct visualization with a 0 vicryl suture. All of the ports were removed under direct vision.   The main midline incision/extraction site was then reapproximated with a 1-0 PDS suture in a running manner. Good reapproximation of the fascia was noted.  Each of the incisions was then irrigated with normal salineand the skin was reapproximated with 4-0 monocryl suture in a subcuticular running manner. The entire area was cleaned and dried and dermabond placed. The Fisher catheter was left to gravity  drainage,draining clear yellow urine at the end of the case. The patient was cleaned and dried and returned to the supine position. He was emerged from general anesthesia without incident and taken to the PACU in stable condition.     COMPLICATIONS: None, immediate.     ESTIMATED BLOOD LOSS: 250 mL.            Discharged

## 2024-07-01 NOTE — ED ADULT NURSE NOTE - MODE OF DISCHARGE
During PAT appointment patient states that she understood the surgical procedure to be different from the posted/ordered surgical procedure.  Surgical consent not obtained.  Patient also states that she does not have a ride to or from hospital for surgery or any help after surgery.  Patient lives alone.    Placed call to Dr. Boone's office and spoke to Beatrice.  Beatrice notified of the above information.  Beatrice states that she will have the  breast clinic  call the patient.      
(lotions, powders, deodorant, or makeup, especially mascara)  Follow Chlorhexidine [employer] body wash instructions provided to you during PAT appointment. Begin 3 days prior to surgery.  Do not shave or trim anywhere 24 hours before surgery  Wear your hair loose or down; no pony-tails, buns, or metal hair clips  Wear loose, comfortable, clean clothes  Wear glasses instead of contacts. Bring a case to keep your glasses safe.  Leave money, valuables, and jewelry, including body piercings, at home     Going Home - or Spending the Night SAME-DAY SURGERY: You must have a responsible adult drive you home and stay with you 24 hours after surgery. You may not drive for 24 hours after surgery.     Special Instructions Free  parking available from 7 AM until 5 PM.       Follow all instructions so your surgery won’t be cancelled.  Please, be on time.                    If a situation occurs and you are delayed the day of surgery, call [phone].    If your physical condition changes (like a fever, cold, flu, etc.) call your surgeon.    Home medication(s) reviewed and verified via LIST and VERBAL   during PAT appointment.    The patient was contacted IN-PERSON.  The patient verbalizes understanding of all instructions and DOES NOT need reinforcement.  
Ambulatory

## 2024-08-19 ENCOUNTER — EMERGENCY (EMERGENCY)
Facility: HOSPITAL | Age: 64
LOS: 1 days | Discharge: HOME | End: 2024-08-19
Attending: STUDENT IN AN ORGANIZED HEALTH CARE EDUCATION/TRAINING PROGRAM | Admitting: STUDENT IN AN ORGANIZED HEALTH CARE EDUCATION/TRAINING PROGRAM
Payer: MEDICAID

## 2024-08-19 VITALS
HEIGHT: 66 IN | RESPIRATION RATE: 16 BRPM | OXYGEN SATURATION: 100 % | WEIGHT: 227.96 LBS | HEART RATE: 86 BPM | TEMPERATURE: 98 F

## 2024-08-19 VITALS
DIASTOLIC BLOOD PRESSURE: 88 MMHG | RESPIRATION RATE: 16 BRPM | HEART RATE: 64 BPM | TEMPERATURE: 98 F | SYSTOLIC BLOOD PRESSURE: 146 MMHG | OXYGEN SATURATION: 96 %

## 2024-08-19 LAB
ANION GAP SERPL CALC-SCNC: 9 MMOL/L — SIGNIFICANT CHANGE UP (ref 5–17)
BASOPHILS # BLD AUTO: 0.04 K/UL — SIGNIFICANT CHANGE UP (ref 0–0.2)
BASOPHILS NFR BLD AUTO: 0.4 % — SIGNIFICANT CHANGE UP (ref 0–2)
BUN SERPL-MCNC: 12 MG/DL — SIGNIFICANT CHANGE UP (ref 7–23)
CALCIUM SERPL-MCNC: 10.7 MG/DL — HIGH (ref 8.4–10.5)
CHLORIDE SERPL-SCNC: 106 MMOL/L — SIGNIFICANT CHANGE UP (ref 96–108)
CO2 SERPL-SCNC: 29 MMOL/L — SIGNIFICANT CHANGE UP (ref 22–31)
CREAT SERPL-MCNC: 0.76 MG/DL — SIGNIFICANT CHANGE UP (ref 0.5–1.3)
EGFR: 89 ML/MIN/1.73M2 — SIGNIFICANT CHANGE UP
EOSINOPHIL # BLD AUTO: 0.12 K/UL — SIGNIFICANT CHANGE UP (ref 0–0.5)
EOSINOPHIL NFR BLD AUTO: 1.3 % — SIGNIFICANT CHANGE UP (ref 0–6)
GLUCOSE SERPL-MCNC: 111 MG/DL — HIGH (ref 70–99)
HCT VFR BLD CALC: 42.9 % — SIGNIFICANT CHANGE UP (ref 34.5–45)
HGB BLD-MCNC: 14.1 G/DL — SIGNIFICANT CHANGE UP (ref 11.5–15.5)
IMM GRANULOCYTES NFR BLD AUTO: 0.2 % — SIGNIFICANT CHANGE UP (ref 0–0.9)
LYMPHOCYTES # BLD AUTO: 2.19 K/UL — SIGNIFICANT CHANGE UP (ref 1–3.3)
LYMPHOCYTES # BLD AUTO: 24.3 % — SIGNIFICANT CHANGE UP (ref 13–44)
MAGNESIUM SERPL-MCNC: 1.8 MG/DL — SIGNIFICANT CHANGE UP (ref 1.6–2.6)
MCHC RBC-ENTMCNC: 29.3 PG — SIGNIFICANT CHANGE UP (ref 27–34)
MCHC RBC-ENTMCNC: 32.9 GM/DL — SIGNIFICANT CHANGE UP (ref 32–36)
MCV RBC AUTO: 89.2 FL — SIGNIFICANT CHANGE UP (ref 80–100)
MONOCYTES # BLD AUTO: 0.59 K/UL — SIGNIFICANT CHANGE UP (ref 0–0.9)
MONOCYTES NFR BLD AUTO: 6.5 % — SIGNIFICANT CHANGE UP (ref 2–14)
NEUTROPHILS # BLD AUTO: 6.07 K/UL — SIGNIFICANT CHANGE UP (ref 1.8–7.4)
NEUTROPHILS NFR BLD AUTO: 67.3 % — SIGNIFICANT CHANGE UP (ref 43–77)
NRBC # BLD: 0 /100 WBCS — SIGNIFICANT CHANGE UP (ref 0–0)
PLATELET # BLD AUTO: 188 K/UL — SIGNIFICANT CHANGE UP (ref 150–400)
POTASSIUM SERPL-MCNC: 3.6 MMOL/L — SIGNIFICANT CHANGE UP (ref 3.5–5.3)
POTASSIUM SERPL-SCNC: 3.6 MMOL/L — SIGNIFICANT CHANGE UP (ref 3.5–5.3)
RBC # BLD: 4.81 M/UL — SIGNIFICANT CHANGE UP (ref 3.8–5.2)
RBC # FLD: 14.7 % — HIGH (ref 10.3–14.5)
SODIUM SERPL-SCNC: 144 MMOL/L — SIGNIFICANT CHANGE UP (ref 135–145)
TROPONIN T, HIGH SENSITIVITY RESULT: <6 NG/L — SIGNIFICANT CHANGE UP (ref 0–51)
WBC # BLD: 9.03 K/UL — SIGNIFICANT CHANGE UP (ref 3.8–10.5)
WBC # FLD AUTO: 9.03 K/UL — SIGNIFICANT CHANGE UP (ref 3.8–10.5)

## 2024-08-19 PROCEDURE — 99283 EMERGENCY DEPT VISIT LOW MDM: CPT | Mod: 25

## 2024-08-19 PROCEDURE — 84484 ASSAY OF TROPONIN QUANT: CPT

## 2024-08-19 PROCEDURE — 36415 COLL VENOUS BLD VENIPUNCTURE: CPT

## 2024-08-19 PROCEDURE — 99285 EMERGENCY DEPT VISIT HI MDM: CPT

## 2024-08-19 PROCEDURE — 93005 ELECTROCARDIOGRAM TRACING: CPT

## 2024-08-19 PROCEDURE — 80048 BASIC METABOLIC PNL TOTAL CA: CPT

## 2024-08-19 PROCEDURE — 85025 COMPLETE CBC W/AUTO DIFF WBC: CPT

## 2024-08-19 PROCEDURE — 93010 ELECTROCARDIOGRAM REPORT: CPT

## 2024-08-19 PROCEDURE — 83735 ASSAY OF MAGNESIUM: CPT

## 2024-08-19 RX ORDER — METOPROLOL TARTRATE 100 MG
1 TABLET ORAL
Qty: 30 | Refills: 0
Start: 2024-08-19 | End: 2024-09-17

## 2024-08-19 NOTE — ED PROVIDER NOTE - OBJECTIVE STATEMENT
64 yr old female, history of htn (on valsartan and metoprolol), presents to the Emergency Department w high blood pressure. pt forgot to take her losartan this morning. when she realized she hadnt taken it she checked her BP and found it was elevated to 200s systolic. took her normal dose and came for evaluation.   earlier today. pt asymptomatic w elevated bp readings. triage documentation notes L sided chest pain and dizziness. pt notes she has had these symptoms intermittently for "a while." used to think it correlated w her BP being elevated but also happens at random times. dizziness further described as feeling like she may pass out / lightheaded. did not have today. had chest pain this morning briefly.  no fever, uri sx, cp, sob, abd pain, back pain, v/d, extremity weakness / numbness, vision changes, speech or gait changes.

## 2024-08-19 NOTE — ED PROVIDER NOTE - PROGRESS NOTE DETAILS
ecg nonischemic  labs unremarkable w HS trop negative  BP improved here w/o interventions (normal BP med pta).   rx sent for metoprolol. ok for dc    All results reviewed with the patient verbally. Discharge plan and return precautions d/w pt who verbalized understanding and agrees with plan. All questions answered. Vitals WNL. Ready for d/c.

## 2024-08-19 NOTE — ED ADULT NURSE NOTE - NSFALLUNIVINTERV_ED_ALL_ED
Bed/Stretcher in lowest position, wheels locked, appropriate side rails in place/Call bell, personal items and telephone in reach/Instruct patient to call for assistance before getting out of bed/chair/stretcher/Non-slip footwear applied when patient is off stretcher/[place] to call system/Physically safe environment - no spills, clutter or unnecessary equipment/Purposeful proactive rounding/Room/bathroom lighting operational, light cord in reach

## 2024-08-19 NOTE — ED PROVIDER NOTE - PATIENT PORTAL LINK FT
You can access the FollowMyHealth Patient Portal offered by [facility] by registering at the following website: [email or web address]. By joining [employer]’s FollowMyHealth portal, you will also be able to view your health information using other applications (apps) compatible with our system.

## 2024-08-19 NOTE — ED PROVIDER NOTE - NSFOLLOWUPINSTRUCTIONS_ED_ALL_ED_FT
Continue all medications as previously prescribed.     Refill of your metoprolol was sent to the pharmacy    Follow up with your primary care doctor within 1 week for continued evaluation.      Return to the Emergency Department for persistent, worsening or new symptoms including severe chest pain, shortness of breath, difficulty breathing, nausea/vomiting, excessive sweating, or any other serious concerns.

## 2024-08-19 NOTE — ED ADULT TRIAGE NOTE - CHIEF COMPLAINT QUOTE
pt c/o hypertension. stated " she has been taking medications but in the past few days she had dizziness, and chest pain" ekg in progress.

## 2024-08-19 NOTE — ED ADULT NURSE NOTE - OBJECTIVE STATEMENT
Pt reports HTN tonight. Pt states she was concerned d/t L rib pain and chest pain. Pt also reports she forgot to take losartan today and reports she ran out of metoprolol tonight. pt denies any headaches, no N/V, no dizziness, no numbness/weakness/tingling. pt AOx4, ambulatory with steady gait.

## 2024-08-19 NOTE — ED PROVIDER NOTE - ATTENDING APP SHARED VISIT CONTRIBUTION OF CARE
I discussed the care of the pt directly with the ACP while the pt was in the ED. i have reviewed the ACP note and agree w/ the history, exam and plan of care other than as noted above. [FreeTextEntry1] : The patient is followed with principal diagnoses of coronary artery disease, status post coronary bypass surgery, diabetes mellitus, history of paroxysmal atrial fibrillation. In recent months we have been treating the patient for chronic stable angina. The treatment has been very effective in reducing the frequency of anginal episodes. Episodes of angina are  provoked by some form of exertion. The episodes are "far less frequent" with the current regimen. Patient has been able to maintain an active lifestyle and continues to exercise regularly. He uses a cane for balance.

## 2024-08-19 NOTE — ED PROVIDER NOTE - EKG #1 DATE/TIME
19-Aug-2024 20:01 left forehead erythema for 1 d with itching/burning sensation.  suspect early zoster but no vesicles yet.  less likely cellulitis as not portal of entry and unusual location.  given rx for both processes with plan to see pmd in morning for reassessment/ guidance on which medication to take.

## 2024-08-19 NOTE — ED PROVIDER NOTE - ATTENDING SHARED VISIT SELECTOR YES
"Chief Complaint   Patient presents with     Well Child       Initial BP (!) 80/60 (BP Location: Right arm, Patient Position: Chair, Cuff Size: Child)  Pulse 108  Temp 97.9  F (36.6  C) (Oral)  Ht 3' 5.25\" (1.048 m)  Wt 36 lb 9.6 oz (16.6 kg)  SpO2 98%  BMI 15.12 kg/m2 Estimated body mass index is 15.12 kg/(m^2) as calculated from the following:    Height as of this encounter: 3' 5.25\" (1.048 m).    Weight as of this encounter: 36 lb 9.6 oz (16.6 kg).  Medication Reconciliation: complete      Health Maintenance addressed:  NONE    N/a    FAY [place]        " Yes

## 2024-08-19 NOTE — ED PROVIDER NOTE - CLINICAL SUMMARY MEDICAL DECISION MAKING FREE TEXT BOX
history of htn (on valsartan and metoprolol), here w asymptomatic htn in setting of not taking her normal daily losartan this morning. took dose prior to coming to ed. occasionally feels CP and lightheadedness that correlates w ?BP being high. had brief CP today, no lightheadedness today. no neuro sx.   pt well appearing, /93 - vitals otherwise unremarkable, exam unremarkable - neuro intact  asymptomatic htn, bp improving since home med  will send screening labs assess for end organ damage given occasional cp and lightheadedness that she associated w bp being elevated   no concern for acs, dissection.

## 2024-08-21 DIAGNOSIS — I10 ESSENTIAL (PRIMARY) HYPERTENSION: ICD-10-CM

## 2024-08-21 DIAGNOSIS — Z91.040 LATEX ALLERGY STATUS: ICD-10-CM

## 2024-08-21 DIAGNOSIS — Z88.1 ALLERGY STATUS TO OTHER ANTIBIOTIC AGENTS: ICD-10-CM

## 2024-08-21 DIAGNOSIS — Z88.0 ALLERGY STATUS TO PENICILLIN: ICD-10-CM

## 2024-09-04 ENCOUNTER — EMERGENCY (EMERGENCY)
Facility: HOSPITAL | Age: 64
LOS: 1 days | Discharge: HOME | End: 2024-09-04
Attending: EMERGENCY MEDICINE | Admitting: EMERGENCY MEDICINE
Payer: COMMERCIAL

## 2024-09-04 VITALS
DIASTOLIC BLOOD PRESSURE: 89 MMHG | RESPIRATION RATE: 16 BRPM | WEIGHT: 220.02 LBS | SYSTOLIC BLOOD PRESSURE: 160 MMHG | OXYGEN SATURATION: 99 % | HEIGHT: 66 IN | TEMPERATURE: 98 F | HEART RATE: 70 BPM

## 2024-09-04 DIAGNOSIS — I10 ESSENTIAL (PRIMARY) HYPERTENSION: ICD-10-CM

## 2024-09-04 DIAGNOSIS — K62.5 HEMORRHAGE OF ANUS AND RECTUM: ICD-10-CM

## 2024-09-04 DIAGNOSIS — M06.9 RHEUMATOID ARTHRITIS, UNSPECIFIED: ICD-10-CM

## 2024-09-04 DIAGNOSIS — K64.9 UNSPECIFIED HEMORRHOIDS: ICD-10-CM

## 2024-09-04 PROCEDURE — 99284 EMERGENCY DEPT VISIT MOD MDM: CPT | Mod: 25

## 2024-09-05 VITALS
HEART RATE: 67 BPM | DIASTOLIC BLOOD PRESSURE: 92 MMHG | SYSTOLIC BLOOD PRESSURE: 155 MMHG | OXYGEN SATURATION: 98 % | TEMPERATURE: 98 F | RESPIRATION RATE: 18 BRPM

## 2024-09-05 LAB
ALBUMIN SERPL ELPH-MCNC: 4.2 G/DL — SIGNIFICANT CHANGE UP (ref 3.3–5)
ALP SERPL-CCNC: 98 U/L — SIGNIFICANT CHANGE UP (ref 40–120)
ALT FLD-CCNC: <5 U/L — LOW (ref 10–45)
ANION GAP SERPL CALC-SCNC: 9 MMOL/L — SIGNIFICANT CHANGE UP (ref 5–17)
AST SERPL-CCNC: 21 U/L — SIGNIFICANT CHANGE UP (ref 10–40)
BASOPHILS # BLD AUTO: 0.06 K/UL — SIGNIFICANT CHANGE UP (ref 0–0.2)
BASOPHILS NFR BLD AUTO: 0.6 % — SIGNIFICANT CHANGE UP (ref 0–2)
BILIRUB SERPL-MCNC: 0.2 MG/DL — SIGNIFICANT CHANGE UP (ref 0.2–1.2)
BLD GP AB SCN SERPL QL: NEGATIVE — SIGNIFICANT CHANGE UP
BUN SERPL-MCNC: 21 MG/DL — SIGNIFICANT CHANGE UP (ref 7–23)
CALCIUM SERPL-MCNC: 10.4 MG/DL — SIGNIFICANT CHANGE UP (ref 8.4–10.5)
CHLORIDE SERPL-SCNC: 108 MMOL/L — SIGNIFICANT CHANGE UP (ref 96–108)
CO2 SERPL-SCNC: 25 MMOL/L — SIGNIFICANT CHANGE UP (ref 22–31)
CREAT SERPL-MCNC: 0.79 MG/DL — SIGNIFICANT CHANGE UP (ref 0.5–1.3)
EGFR: 85 ML/MIN/1.73M2 — SIGNIFICANT CHANGE UP
EGFR: 85 ML/MIN/1.73M2 — SIGNIFICANT CHANGE UP
EOSINOPHIL # BLD AUTO: 0.19 K/UL — SIGNIFICANT CHANGE UP (ref 0–0.5)
EOSINOPHIL NFR BLD AUTO: 2 % — SIGNIFICANT CHANGE UP (ref 0–6)
GLUCOSE SERPL-MCNC: 130 MG/DL — HIGH (ref 70–99)
HCT VFR BLD CALC: 39.9 % — SIGNIFICANT CHANGE UP (ref 34.5–45)
HGB BLD-MCNC: 12.9 G/DL — SIGNIFICANT CHANGE UP (ref 11.5–15.5)
IMM GRANULOCYTES NFR BLD AUTO: 0.2 % — SIGNIFICANT CHANGE UP (ref 0–0.9)
LACTATE SERPL-SCNC: 1.2 MMOL/L — SIGNIFICANT CHANGE UP (ref 0.5–2)
LIDOCAIN IGE QN: 34 U/L — SIGNIFICANT CHANGE UP (ref 7–60)
LYMPHOCYTES # BLD AUTO: 2.37 K/UL — SIGNIFICANT CHANGE UP (ref 1–3.3)
LYMPHOCYTES # BLD AUTO: 24.7 % — SIGNIFICANT CHANGE UP (ref 13–44)
MCHC RBC-ENTMCNC: 29.1 PG — SIGNIFICANT CHANGE UP (ref 27–34)
MCHC RBC-ENTMCNC: 32.3 GM/DL — SIGNIFICANT CHANGE UP (ref 32–36)
MCV RBC AUTO: 90.1 FL — SIGNIFICANT CHANGE UP (ref 80–100)
MONOCYTES # BLD AUTO: 0.69 K/UL — SIGNIFICANT CHANGE UP (ref 0–0.9)
MONOCYTES NFR BLD AUTO: 7.2 % — SIGNIFICANT CHANGE UP (ref 2–14)
NEUTROPHILS # BLD AUTO: 6.25 K/UL — SIGNIFICANT CHANGE UP (ref 1.8–7.4)
NEUTROPHILS NFR BLD AUTO: 65.3 % — SIGNIFICANT CHANGE UP (ref 43–77)
NRBC # BLD: 0 /100 WBCS — SIGNIFICANT CHANGE UP (ref 0–0)
NRBC BLD-RTO: 0 /100 WBCS — SIGNIFICANT CHANGE UP (ref 0–0)
PLATELET # BLD AUTO: 160 K/UL — SIGNIFICANT CHANGE UP (ref 150–400)
POTASSIUM SERPL-MCNC: 3.9 MMOL/L — SIGNIFICANT CHANGE UP (ref 3.5–5.3)
POTASSIUM SERPL-SCNC: 3.9 MMOL/L — SIGNIFICANT CHANGE UP (ref 3.5–5.3)
PROT SERPL-MCNC: 7.3 G/DL — SIGNIFICANT CHANGE UP (ref 6–8.3)
RBC # BLD: 4.43 M/UL — SIGNIFICANT CHANGE UP (ref 3.8–5.2)
RBC # FLD: 14.6 % — HIGH (ref 10.3–14.5)
RH IG SCN BLD-IMP: POSITIVE — SIGNIFICANT CHANGE UP
SODIUM SERPL-SCNC: 142 MMOL/L — SIGNIFICANT CHANGE UP (ref 135–145)
WBC # BLD: 9.58 K/UL — SIGNIFICANT CHANGE UP (ref 3.8–10.5)
WBC # FLD AUTO: 9.58 K/UL — SIGNIFICANT CHANGE UP (ref 3.8–10.5)

## 2024-09-05 PROCEDURE — 36415 COLL VENOUS BLD VENIPUNCTURE: CPT

## 2024-09-05 PROCEDURE — 83605 ASSAY OF LACTIC ACID: CPT

## 2024-09-05 PROCEDURE — 85025 COMPLETE CBC W/AUTO DIFF WBC: CPT

## 2024-09-05 PROCEDURE — 99283 EMERGENCY DEPT VISIT LOW MDM: CPT

## 2024-09-05 PROCEDURE — 86901 BLOOD TYPING SEROLOGIC RH(D): CPT

## 2024-09-05 PROCEDURE — 80053 COMPREHEN METABOLIC PANEL: CPT

## 2024-09-05 PROCEDURE — 86900 BLOOD TYPING SEROLOGIC ABO: CPT

## 2024-09-05 PROCEDURE — 83690 ASSAY OF LIPASE: CPT

## 2024-09-05 PROCEDURE — 86850 RBC ANTIBODY SCREEN: CPT

## 2024-09-06 ENCOUNTER — INPATIENT (INPATIENT)
Facility: HOSPITAL | Age: 64
LOS: 3 days | Discharge: HOME | DRG: 379 | End: 2024-09-10
Attending: SURGERY | Admitting: STUDENT IN AN ORGANIZED HEALTH CARE EDUCATION/TRAINING PROGRAM
Payer: MEDICAID

## 2024-09-06 VITALS
RESPIRATION RATE: 18 BRPM | HEART RATE: 95 BPM | WEIGHT: 220.02 LBS | DIASTOLIC BLOOD PRESSURE: 81 MMHG | TEMPERATURE: 98 F | SYSTOLIC BLOOD PRESSURE: 137 MMHG | HEIGHT: 66 IN | OXYGEN SATURATION: 97 %

## 2024-09-06 DIAGNOSIS — K64.4 RESIDUAL HEMORRHOIDAL SKIN TAGS: ICD-10-CM

## 2024-09-06 DIAGNOSIS — K64.9 UNSPECIFIED HEMORRHOIDS: ICD-10-CM

## 2024-09-06 DIAGNOSIS — E83.39 OTHER DISORDERS OF PHOSPHORUS METABOLISM: ICD-10-CM

## 2024-09-06 DIAGNOSIS — K92.2 GASTROINTESTINAL HEMORRHAGE, UNSPECIFIED: ICD-10-CM

## 2024-09-06 DIAGNOSIS — I10 ESSENTIAL (PRIMARY) HYPERTENSION: ICD-10-CM

## 2024-09-06 DIAGNOSIS — M06.9 RHEUMATOID ARTHRITIS, UNSPECIFIED: ICD-10-CM

## 2024-09-06 DIAGNOSIS — Z88.1 ALLERGY STATUS TO OTHER ANTIBIOTIC AGENTS: ICD-10-CM

## 2024-09-06 DIAGNOSIS — K57.30 DIVERTICULOSIS OF LARGE INTESTINE WITHOUT PERFORATION OR ABSCESS WITHOUT BLEEDING: ICD-10-CM

## 2024-09-06 DIAGNOSIS — Z91.040 LATEX ALLERGY STATUS: ICD-10-CM

## 2024-09-06 LAB
ANION GAP SERPL CALC-SCNC: 5 MMOL/L — SIGNIFICANT CHANGE UP (ref 5–17)
ANION GAP SERPL CALC-SCNC: 8 MMOL/L — SIGNIFICANT CHANGE UP (ref 5–17)
APTT BLD: 31.2 SEC — SIGNIFICANT CHANGE UP (ref 24.5–35.6)
BLD GP AB SCN SERPL QL: NEGATIVE — SIGNIFICANT CHANGE UP
BUN SERPL-MCNC: 18 MG/DL — SIGNIFICANT CHANGE UP (ref 7–23)
BUN SERPL-MCNC: 20 MG/DL — SIGNIFICANT CHANGE UP (ref 7–23)
CALCIUM SERPL-MCNC: 11 MG/DL — HIGH (ref 8.4–10.5)
CALCIUM SERPL-MCNC: 11.3 MG/DL — HIGH (ref 8.4–10.5)
CHLORIDE SERPL-SCNC: 109 MMOL/L — HIGH (ref 96–108)
CHLORIDE SERPL-SCNC: 109 MMOL/L — HIGH (ref 96–108)
CO2 SERPL-SCNC: 25 MMOL/L — SIGNIFICANT CHANGE UP (ref 22–31)
CO2 SERPL-SCNC: 30 MMOL/L — SIGNIFICANT CHANGE UP (ref 22–31)
CREAT SERPL-MCNC: 0.81 MG/DL — SIGNIFICANT CHANGE UP (ref 0.5–1.3)
CREAT SERPL-MCNC: 1.01 MG/DL — SIGNIFICANT CHANGE UP (ref 0.5–1.3)
EGFR: 63 ML/MIN/1.73M2 — SIGNIFICANT CHANGE UP
EGFR: 82 ML/MIN/1.73M2 — SIGNIFICANT CHANGE UP
GLUCOSE BLDC GLUCOMTR-MCNC: 139 MG/DL — HIGH (ref 70–99)
GLUCOSE SERPL-MCNC: 108 MG/DL — HIGH (ref 70–99)
GLUCOSE SERPL-MCNC: 141 MG/DL — HIGH (ref 70–99)
HCT VFR BLD CALC: 34.5 % — SIGNIFICANT CHANGE UP (ref 34.5–45)
HCT VFR BLD CALC: 37.2 % — SIGNIFICANT CHANGE UP (ref 34.5–45)
HGB BLD-MCNC: 11.2 G/DL — LOW (ref 11.5–15.5)
HGB BLD-MCNC: 12.2 G/DL — SIGNIFICANT CHANGE UP (ref 11.5–15.5)
INR BLD: 0.94 — SIGNIFICANT CHANGE UP (ref 0.85–1.18)
MCHC RBC-ENTMCNC: 28.9 PG — SIGNIFICANT CHANGE UP (ref 27–34)
MCHC RBC-ENTMCNC: 29.4 PG — SIGNIFICANT CHANGE UP (ref 27–34)
MCHC RBC-ENTMCNC: 32.5 GM/DL — SIGNIFICANT CHANGE UP (ref 32–36)
MCHC RBC-ENTMCNC: 32.8 GM/DL — SIGNIFICANT CHANGE UP (ref 32–36)
MCV RBC AUTO: 88.9 FL — SIGNIFICANT CHANGE UP (ref 80–100)
MCV RBC AUTO: 89.6 FL — SIGNIFICANT CHANGE UP (ref 80–100)
NRBC # BLD: 0 /100 WBCS — SIGNIFICANT CHANGE UP (ref 0–0)
NRBC # BLD: 0 /100 WBCS — SIGNIFICANT CHANGE UP (ref 0–0)
PLATELET # BLD AUTO: 163 K/UL — SIGNIFICANT CHANGE UP (ref 150–400)
PLATELET # BLD AUTO: 173 K/UL — SIGNIFICANT CHANGE UP (ref 150–400)
POTASSIUM SERPL-MCNC: 3.6 MMOL/L — SIGNIFICANT CHANGE UP (ref 3.5–5.3)
POTASSIUM SERPL-MCNC: 4 MMOL/L — SIGNIFICANT CHANGE UP (ref 3.5–5.3)
POTASSIUM SERPL-SCNC: 3.6 MMOL/L — SIGNIFICANT CHANGE UP (ref 3.5–5.3)
POTASSIUM SERPL-SCNC: 4 MMOL/L — SIGNIFICANT CHANGE UP (ref 3.5–5.3)
PROTHROM AB SERPL-ACNC: 10.7 SEC — SIGNIFICANT CHANGE UP (ref 9.5–13)
RBC # BLD: 3.88 M/UL — SIGNIFICANT CHANGE UP (ref 3.8–5.2)
RBC # BLD: 4.15 M/UL — SIGNIFICANT CHANGE UP (ref 3.8–5.2)
RBC # FLD: 14.7 % — HIGH (ref 10.3–14.5)
RBC # FLD: 14.8 % — HIGH (ref 10.3–14.5)
RH IG SCN BLD-IMP: POSITIVE — SIGNIFICANT CHANGE UP
SODIUM SERPL-SCNC: 142 MMOL/L — SIGNIFICANT CHANGE UP (ref 135–145)
SODIUM SERPL-SCNC: 144 MMOL/L — SIGNIFICANT CHANGE UP (ref 135–145)
TROPONIN T, HIGH SENSITIVITY RESULT: 6 NG/L — SIGNIFICANT CHANGE UP (ref 0–51)
WBC # BLD: 12.53 K/UL — HIGH (ref 3.8–10.5)
WBC # BLD: 9.2 K/UL — SIGNIFICANT CHANGE UP (ref 3.8–10.5)
WBC # FLD AUTO: 12.53 K/UL — HIGH (ref 3.8–10.5)
WBC # FLD AUTO: 9.2 K/UL — SIGNIFICANT CHANGE UP (ref 3.8–10.5)

## 2024-09-06 PROCEDURE — 74174 CTA ABD&PLVS W/CONTRAST: CPT | Mod: 26

## 2024-09-06 PROCEDURE — 99285 EMERGENCY DEPT VISIT HI MDM: CPT

## 2024-09-06 RX ORDER — ACETAMINOPHEN 325 MG/1
1000 TABLET ORAL ONCE
Refills: 0 | Status: COMPLETED | OUTPATIENT
Start: 2024-09-06 | End: 2024-09-07

## 2024-09-06 RX ORDER — PANTOPRAZOLE SODIUM 40 MG
40 TABLET, DELAYED RELEASE (ENTERIC COATED) ORAL DAILY
Refills: 0 | Status: DISCONTINUED | OUTPATIENT
Start: 2024-09-06 | End: 2024-09-09

## 2024-09-06 RX ORDER — ONDANSETRON 2 MG/ML
4 INJECTION, SOLUTION INTRAMUSCULAR; INTRAVENOUS EVERY 6 HOURS
Refills: 0 | Status: DISCONTINUED | OUTPATIENT
Start: 2024-09-06 | End: 2024-09-10

## 2024-09-06 RX ORDER — SODIUM CHLORIDE 9 MG/ML
1000 INJECTION INTRAMUSCULAR; INTRAVENOUS; SUBCUTANEOUS ONCE
Refills: 0 | Status: COMPLETED | OUTPATIENT
Start: 2024-09-06 | End: 2024-09-06

## 2024-09-06 RX ADMIN — Medication 40 MILLIGRAM(S): at 22:12

## 2024-09-06 RX ADMIN — SODIUM CHLORIDE 1000 MILLILITER(S): 9 INJECTION INTRAMUSCULAR; INTRAVENOUS; SUBCUTANEOUS at 15:48

## 2024-09-06 RX ADMIN — Medication 130 MILLILITER(S): at 22:12

## 2024-09-06 NOTE — H&P ADULT - NSHPPHYSICALEXAM_GEN_ALL_CORE
Physical Exam  General: AAOx3, NAD, laying comfortably in bed  Cardio: S1,S2, No MRG  Pulm: Nonlabored breathing  Abdomen: soft, NT, ND, no guarding or rigidity.   MELCHOR: small gush of stol and dark blood clots, and skin tags on perianal area, with non-thrombosed, non bleeding external hemorrhoids.   Extremities: WWP, peripheral pulses appreciated

## 2024-09-06 NOTE — ED ADULT NURSE NOTE - OBJECTIVE STATEMENT
63 yo female c/o bloody stools x several days. Reports she has hemorrhoids, was seen in this ED 2 days ago, bleeding has continued. Reports bright red blood in stool x3 yesterday. Denies dizziness, nausea, SOB, CP at time of initial assessment. AAOx4, NAD, ambulatory with steady gait.

## 2024-09-06 NOTE — H&P ADULT - ASSESSMENT
64 year old female patient with complaints of bloody stools for 3 days. Patient was to be dicharged when felt dizzy, had another bloody bowel movement with blood clots and Hgb drop to 11.2 for 12.2 on admission. Patient refers no history of constipation, and not having active external hemorrhoids in over 10 years, but occasional blood on wipping. Abdomen soft, NT, ND, no guarding or rigidity. MELCHOR small gush of stol and dark blood clots, and skin tags on perianal area with non-thrombosed, non bleeding external hemorrhoids. Labs remarkable for Hgb 11.2 from 12.2. Surgery consulted for rectal bleeding.      Plan:    Admit to Telemetry under Dr. Rangel  CTA A/P   GI consult with Dr. Whipple for possible colonoscopy  NPO  IVF@130CC  2 large bore IV catheters  Trend Hgb q6hrs  Strict I/Os  OOB/IS     64 year old female patient with complaints of bloody stools for 3 days. Patient was to be discharged when felt dizzy, had another bloody bowel movement with blood clots and Hgb drop to 11.2 for 12.2 on admission. Patient refers no history of constipation, and not having active external hemorrhoids in over 10 years, but occasional blood on wiping Abdomen soft, NT, ND, no guarding or rigidity. MELCHOR small gush of stool and dark blood clots, and skin tags on perianal area with non-thrombosed, non bleeding external hemorrhoids. Labs remarkable for Hgb 11.2 from 12.2. Surgery consulted for rectal bleeding.      Plan:    Admit to Telemetry under Dr. Rangel  CTA A/P   GI consult with Dr. Whipple for possible colonoscopy  NPO  IVF@130CC  2 large bore IV catheters  Trend Hgb q6hrs  Strict I/Os  OOB/IS

## 2024-09-06 NOTE — ED ADULT NURSE REASSESSMENT NOTE - NS ED NURSE REASSESS COMMENT FT1
Pt began feeling lightheaded after discharge, pt diaphretic, pale, MD called. B/p in 70s, new IV placed, labs drawn, fluids initiated, MD at bedside, EKG in progress.

## 2024-09-06 NOTE — H&P ADULT - HISTORY OF PRESENT ILLNESS
64 year old female patient with PMH HTN, Rheumatoid arthritis, internal and external hemorrhoids; and PSH of bilateral TKR. Patient came to ED with complaints of bloody stools for 3 days. Patient was to be dicharged when felt dizzy, had another bloody bowel movement with blood clots and Hgb drop to 11.2 for 12.2 on admission. Patient refers no history of constipation, and not having active external hemorrhoids in over 10 years. PAtietn has no complaints of nausea, vomiting, abdominal pain or changes in bowel movements. Surgery consulted for possible active rectal bleeding.     In ED patient afebrile, HR 79, /65, Sat 98%. Abdomen soft, NT, ND, no guarding or rigidity. MELCHOR small gush of stol and dark blood clots, and skin tags on perianal area with non-thrombosed, non bleeding external hemorrhoids. Labs remarkaable for Hgb 11.2 from 12.2, WBC 12.53, Plt 173, Na 142, K 3.6, PT 10.7, PTT 31.2, INR 0.94.     PMH: HTN, Rheumatoid arthritis, internal and external hemorrhoids  PSH: of bilateral TKR  Meds: methrotrexate , hydroxychloroquine, losartan 100mg, metoprolol succinate 100mg, Hctz 25mg,   Allergies: Ciprofloxacin, Augmentin  Social; Social drinker  EGD: Denies  C-scope; Denies      T(C): 36.8 (09-06-24 @ 21:01), Max: 36.8 (09-06-24 @ 12:52)  HR: 76 (09-06-24 @ 21:01) (67 - 95)  BP: 123/85 (09-06-24 @ 21:01) (75/55 - 137/81)  RR: 20 (09-06-24 @ 21:01) (16 - 20)  SpO2: 99% (09-06-24 @ 21:01) (96% - 99%)      LABS:                        11.2   12.53 )-----------( 173      ( 06 Sep 2024 15:52 )             34.5     09-06    142  |  109<H>  |  20  ----------------------------<  141<H>  3.6   |  25  |  1.01    Ca    11.0<H>      06 Sep 2024 15:52    TPro  7.3  /  Alb  4.2  /  TBili  0.2  /  DBili  x   /  AST  21  /  ALT  <5<L>  /  AlkPhos  98  09-05    PT/INR - ( 06 Sep 2024 15:52 )   PT: 10.7 sec;   INR: 0.94          PTT - ( 06 Sep 2024 15:52 )  PTT:31.2 sec       64 year old female patient with PMH HTN, Rheumatoid arthritis, internal and external hemorrhoids; and PSH of bilateral TKR. Patient came to ED with complaints of bloody stools for 3 days. Patient was to be discharged when felt dizzy, had another bloody bowel movement with blood clots and Hgb drop to 11.2 for 12.2 on admission. Patient refers no history of constipation, and not having active external hemorrhoids in over 10 years. Patient has no complaints of nausea, vomiting, abdominal pain or changes in bowel movements. Surgery consulted for possible active rectal bleeding.     In ED patient afebrile, HR 79, /65, Sat 98%. Abdomen soft, NT, ND, no guarding or rigidity. MELCHOR small gush of stool and dark blood clots, and skin tags on perianal area with non-thrombosed, non bleeding external hemorrhoids. Labs remarkable for Hgb 11.2 from 12.2, WBC 12.53, Plt 173, Na 142, K 3.6, PT 10.7, PTT 31.2, INR 0.94.     PMH: HTN, Rheumatoid arthritis, internal and external hemorrhoids  PSH: bilateral TKR  Meds: methotrexate , hydroxychloroquine, losartan 100mg, metoprolol succinate 100mg, Hctz 25mg,   Allergies: Ciprofloxacin, Augmentin  Social; Social drinker  EGD: Denies  C-scope; Denies      T(C): 36.8 (09-06-24 @ 21:01), Max: 36.8 (09-06-24 @ 12:52)  HR: 76 (09-06-24 @ 21:01) (67 - 95)  BP: 123/85 (09-06-24 @ 21:01) (75/55 - 137/81)  RR: 20 (09-06-24 @ 21:01) (16 - 20)  SpO2: 99% (09-06-24 @ 21:01) (96% - 99%)      LABS:                        11.2   12.53 )-----------( 173      ( 06 Sep 2024 15:52 )             34.5     09-06    142  |  109<H>  |  20  ----------------------------<  141<H>  3.6   |  25  |  1.01    Ca    11.0<H>      06 Sep 2024 15:52    TPro  7.3  /  Alb  4.2  /  TBili  0.2  /  DBili  x   /  AST  21  /  ALT  <5<L>  /  AlkPhos  98  09-05    PT/INR - ( 06 Sep 2024 15:52 )   PT: 10.7 sec;   INR: 0.94          PTT - ( 06 Sep 2024 15:52 )  PTT:31.2 sec

## 2024-09-06 NOTE — ED PROVIDER NOTE - CLINICAL SUMMARY MEDICAL DECISION MAKING FREE TEXT BOX
Pt here with hemorrhoids bleeding. No active bleeding at this time. No signs of thrombosis. Will re-check CBC for hgb, pt has GI f/u on monday. Pt here with hemorrhoids bleeding. No active bleeding at this time. No signs of thrombosis. Will re-check CBC for hgb, pt has GI f/u on monday.    Normal hgb Pt here with hemorrhoids bleeding. No active bleeding at this time. No signs of thrombosis. Will re-check CBC for hgb, pt has GI f/u on monday.    pt had episode of bloody stools with near syncope, EKG done unchanged from prior. Hgb dropped 1 unit. Surg consulted, will admit pt for monitoring, recommend CTA abdomen pelvis in meantime.

## 2024-09-06 NOTE — ED PROVIDER NOTE - OBJECTIVE STATEMENT
64-year-old female history of  hypertension, RA on methotrexate internal/external hemorrhoids here today with rectal bleeding which began few days ago, seen here 2 days ago and discharged with GI f/u, now with continued bleeding. Earlier in the night had 2-3 episodes of normal stool with blood in toilet bowl.  Does not take any anticoagulation.  Has no associated abdominal pain or rectal pain.  Denies dizziness nausea lightheadedness fever chills cough. GI f/u monday

## 2024-09-06 NOTE — ED ADULT NURSE NOTE - NSFALLUNIVINTERV_ED_ALL_ED
Bed/Stretcher in lowest position, wheels locked, appropriate side rails in place/Call bell, personal items and telephone in reach/Instruct patient to call for assistance before getting out of bed/chair/stretcher/Non-slip footwear applied when patient is off stretcher/[place] to call system/Physically safe environment - no spills, clutter or unnecessary equipment/Purposeful proactive rounding/Room/bathroom lighting operational, light cord in reach

## 2024-09-06 NOTE — H&P ADULT - ATTENDING COMMENTS
Feeling ok.  No GI bleed today.  Last two H&Hs are stable.  Hemodynamically ok.  CT noted. Most likely diverticular bleed.   Per GI colonoscopy on Monday.  Start diet for now.   Bowel prep tomorrow.

## 2024-09-06 NOTE — ED PROVIDER NOTE - PATIENT PORTAL LINK FT
You can access the FollowMyHealth Patient Portal offered by [facility] by registering at the following website: [email or web address]. By joining [employer]’s FollowMyHealth portal, you will also be able to view your health information using other applications (apps) compatible with our system.

## 2024-09-07 LAB
ANION GAP SERPL CALC-SCNC: 7 MMOL/L — SIGNIFICANT CHANGE UP (ref 5–17)
BASOPHILS # BLD AUTO: 0.05 K/UL — SIGNIFICANT CHANGE UP (ref 0–0.2)
BASOPHILS NFR BLD AUTO: 0.6 % — SIGNIFICANT CHANGE UP (ref 0–2)
BUN SERPL-MCNC: 19 MG/DL — SIGNIFICANT CHANGE UP (ref 7–23)
CALCIUM SERPL-MCNC: 9.6 MG/DL — SIGNIFICANT CHANGE UP (ref 8.4–10.5)
CHLORIDE SERPL-SCNC: 112 MMOL/L — HIGH (ref 96–108)
CO2 SERPL-SCNC: 24 MMOL/L — SIGNIFICANT CHANGE UP (ref 22–31)
CREAT SERPL-MCNC: 0.69 MG/DL — SIGNIFICANT CHANGE UP (ref 0.5–1.3)
EGFR: 98 ML/MIN/1.73M2 — SIGNIFICANT CHANGE UP
EOSINOPHIL # BLD AUTO: 0.24 K/UL — SIGNIFICANT CHANGE UP (ref 0–0.5)
EOSINOPHIL NFR BLD AUTO: 2.8 % — SIGNIFICANT CHANGE UP (ref 0–6)
GLUCOSE SERPL-MCNC: 102 MG/DL — HIGH (ref 70–99)
HCT VFR BLD CALC: 29.2 % — LOW (ref 34.5–45)
HCT VFR BLD CALC: 29.2 % — LOW (ref 34.5–45)
HCT VFR BLD CALC: 29.5 % — LOW (ref 34.5–45)
HCT VFR BLD CALC: 32.5 % — LOW (ref 34.5–45)
HGB BLD-MCNC: 10.4 G/DL — LOW (ref 11.5–15.5)
HGB BLD-MCNC: 9.4 G/DL — LOW (ref 11.5–15.5)
HGB BLD-MCNC: 9.5 G/DL — LOW (ref 11.5–15.5)
HGB BLD-MCNC: 9.6 G/DL — LOW (ref 11.5–15.5)
IMM GRANULOCYTES NFR BLD AUTO: 0.2 % — SIGNIFICANT CHANGE UP (ref 0–0.9)
LYMPHOCYTES # BLD AUTO: 2.43 K/UL — SIGNIFICANT CHANGE UP (ref 1–3.3)
LYMPHOCYTES # BLD AUTO: 28.7 % — SIGNIFICANT CHANGE UP (ref 13–44)
MAGNESIUM SERPL-MCNC: 1.8 MG/DL — SIGNIFICANT CHANGE UP (ref 1.6–2.6)
MCHC RBC-ENTMCNC: 29.4 PG — SIGNIFICANT CHANGE UP (ref 27–34)
MCHC RBC-ENTMCNC: 29.9 PG — SIGNIFICANT CHANGE UP (ref 27–34)
MCHC RBC-ENTMCNC: 30.3 PG — SIGNIFICANT CHANGE UP (ref 27–34)
MCHC RBC-ENTMCNC: 30.4 PG — SIGNIFICANT CHANGE UP (ref 27–34)
MCHC RBC-ENTMCNC: 32 GM/DL — SIGNIFICANT CHANGE UP (ref 32–36)
MCHC RBC-ENTMCNC: 32.2 GM/DL — SIGNIFICANT CHANGE UP (ref 32–36)
MCHC RBC-ENTMCNC: 32.5 GM/DL — SIGNIFICANT CHANGE UP (ref 32–36)
MCHC RBC-ENTMCNC: 32.5 GM/DL — SIGNIFICANT CHANGE UP (ref 32–36)
MCV RBC AUTO: 90.5 FL — SIGNIFICANT CHANGE UP (ref 80–100)
MCV RBC AUTO: 93.4 FL — SIGNIFICANT CHANGE UP (ref 80–100)
MCV RBC AUTO: 93.6 FL — SIGNIFICANT CHANGE UP (ref 80–100)
MCV RBC AUTO: 94.2 FL — SIGNIFICANT CHANGE UP (ref 80–100)
MONOCYTES # BLD AUTO: 0.62 K/UL — SIGNIFICANT CHANGE UP (ref 0–0.9)
MONOCYTES NFR BLD AUTO: 7.3 % — SIGNIFICANT CHANGE UP (ref 2–14)
NEUTROPHILS # BLD AUTO: 5.11 K/UL — SIGNIFICANT CHANGE UP (ref 1.8–7.4)
NEUTROPHILS NFR BLD AUTO: 60.4 % — SIGNIFICANT CHANGE UP (ref 43–77)
NRBC # BLD: 0 /100 WBCS — SIGNIFICANT CHANGE UP (ref 0–0)
PHOSPHATE SERPL-MCNC: 3 MG/DL — SIGNIFICANT CHANGE UP (ref 2.5–4.5)
PLATELET # BLD AUTO: 111 K/UL — LOW (ref 150–400)
PLATELET # BLD AUTO: 125 K/UL — LOW (ref 150–400)
PLATELET # BLD AUTO: 125 K/UL — LOW (ref 150–400)
PLATELET # BLD AUTO: 126 K/UL — LOW (ref 150–400)
POTASSIUM SERPL-MCNC: 4 MMOL/L — SIGNIFICANT CHANGE UP (ref 3.5–5.3)
POTASSIUM SERPL-SCNC: 4 MMOL/L — SIGNIFICANT CHANGE UP (ref 3.5–5.3)
RBC # BLD: 3.1 M/UL — LOW (ref 3.8–5.2)
RBC # BLD: 3.12 M/UL — LOW (ref 3.8–5.2)
RBC # BLD: 3.26 M/UL — LOW (ref 3.8–5.2)
RBC # BLD: 3.48 M/UL — LOW (ref 3.8–5.2)
RBC # FLD: 14.9 % — HIGH (ref 10.3–14.5)
RBC # FLD: 14.9 % — HIGH (ref 10.3–14.5)
RBC # FLD: 15.1 % — HIGH (ref 10.3–14.5)
RBC # FLD: 15.3 % — HIGH (ref 10.3–14.5)
SODIUM SERPL-SCNC: 143 MMOL/L — SIGNIFICANT CHANGE UP (ref 135–145)
WBC # BLD: 10.93 K/UL — HIGH (ref 3.8–10.5)
WBC # BLD: 6.6 K/UL — SIGNIFICANT CHANGE UP (ref 3.8–10.5)
WBC # BLD: 6.97 K/UL — SIGNIFICANT CHANGE UP (ref 3.8–10.5)
WBC # BLD: 8.47 K/UL — SIGNIFICANT CHANGE UP (ref 3.8–10.5)
WBC # FLD AUTO: 10.93 K/UL — HIGH (ref 3.8–10.5)
WBC # FLD AUTO: 6.6 K/UL — SIGNIFICANT CHANGE UP (ref 3.8–10.5)
WBC # FLD AUTO: 6.97 K/UL — SIGNIFICANT CHANGE UP (ref 3.8–10.5)
WBC # FLD AUTO: 8.47 K/UL — SIGNIFICANT CHANGE UP (ref 3.8–10.5)

## 2024-09-07 PROCEDURE — 99222 1ST HOSP IP/OBS MODERATE 55: CPT

## 2024-09-07 PROCEDURE — 99223 1ST HOSP IP/OBS HIGH 75: CPT

## 2024-09-07 RX ADMIN — ACETAMINOPHEN 1000 MILLIGRAM(S): 325 TABLET ORAL at 15:33

## 2024-09-07 RX ADMIN — Medication 100 GRAM(S): at 09:57

## 2024-09-07 RX ADMIN — ACETAMINOPHEN 400 MILLIGRAM(S): 325 TABLET ORAL at 15:18

## 2024-09-07 RX ADMIN — Medication 40 MILLIGRAM(S): at 11:21

## 2024-09-07 RX ADMIN — Medication 130 MILLILITER(S): at 05:17

## 2024-09-07 RX ADMIN — Medication 130 MILLILITER(S): at 15:18

## 2024-09-07 NOTE — CONSULT NOTE ADULT - ATTENDING COMMENTS
64F w/ Hx of RA presenting with painless hematochezia x 3 days and lightheadedness. HD stable on admission but HB dropped ~12 -> 9.5 -> 9.6. No further bleeding after admission and no BMs since yesterday. Admission CTA negative for active bleeding. Pt denies any past COL. Suspect diverticular bleed that resolved but recommend Dx COL to rule out other etiologies. Plan for COL on MON. Ok for regular diet for today.

## 2024-09-07 NOTE — CONSULT NOTE ADULT - ASSESSMENT
64 year old female patient with PMH of HTN, Rheumatoid arthritis, internal and external hemorrhoids who presented w/ multiple episodes of rectal bleeding.    #Hematochezia  #LGIB  - Patient w/ multiple episodes of hematochezia c/f LGIB. Most likely 2/2 diverticular bleed given description of symptoms but patient has not had prior endoscopic evaluation so warrants colonoscopic evaluation to assess for other causes of hematochezia.   Recommendations:  - Trend CBC, maintain active T&S  - Maintain 2 large bore IVs  - Regular diet today  - Clear liquid diet tomorrow  - Tentative plan for colonoscopy on 9/9, pending patient's clinical course     Case discussed w/ GI attending Dr. Emma Moore MD  PGY-4 GI Fellow  GI consult pager (M-[id number]): [phone]  Please call  for on-call fellow after hours  
64 F with HTN, RA, hemorrhoids, presented with hematochezia, believed to be diverticular in nature.    1) Hematochezia:  - Closely monitored with Q8 hour CBCs.  - Two large bore IVs.  - IV fluids.  - GI following.  - Tentative plan for colonoscopy 9/9/24.  - Supportive care.  2) HTN:  - HCTZ, Losartan and Metoprolol on hold.  3) RA:  - MTX on hold.  4) DVT prophylaxis:  - PAS

## 2024-09-07 NOTE — PROGRESS NOTE ADULT - SUBJECTIVE AND OBJECTIVE BOX
[facility] AMBULATORY ENCOUNTER  PLASTIC AND RECONSTRUCTIVE SURGERY POSTOP VISIT    REASON FOR VISIT:  Post-op    DATE OF SURGERY: 9/22/2022 (post-op day #264), 9/19/2022 (post-op day #267)    PROCEDURE:  1.  Exploration and evacuation of right reconstructed breast deep inferior epigastric  flap postoperative hematoma with intermediate repair of central right reconstructed breast (anterior chest wall).  2.  Evacuation of left deep inferior epigastric  flap postoperative hematoma.    Immediate bilateral breast reconstruction with deep inferior epigastric artery  free flaps, partial resection of left third and right fourth rib, and 10 x 10 cm deepithelialized inferolateral dermal hinge flap.  Incidental repair of umbilical hernia (by Dr. Que Jorgensen).Weight: L:618 g R: 582 g    HISTORY OF PRESENT ILLNESS:  Anita Villanueva is a 52 year old female who [facility] today for follow-up status post above-mentioned procedure. Patient states she is doing very well and denies pain.    Her only concern cording right axilla doing PT x 1 month and continues for another month and is definitely improving. Otherwise she no complaints or concerns and is very happy. LOS 10/10 in terms of shape and volume and symmetry, as well as abdomen contour improvement.  She is interested in nipple reconstruction but is in no hurry.     Interval history:    EXAM:    Visit Vitals  LMP  (LMP Unknown)     General: Alert, in no acute distress, follows commands, moves all extremities  Right breast: PATITO flap warm and well perfused with 2 second capillary refill. Small raw surface a inverted T with borders of neoepithelium and almost completely healed- likely breaking down with gauze. Lowwer inner mastectomy flap pitting post-operative edema and induration but no underlying mass ot tenderness. Scar epithelium at inverted T is now completely healed. Appropriately tender upon exam. Soft, without evidence  of fluid collection. No erythema or signs of infection. Central skin paddle warm with diffuse hyperpigmentation.  Left breast: PATITO flap warm and well perfused with 2 second capillary refill. Incisions well healed without hypertrophic scarring. Expected post-operative edema noted. Appropriately tender upon exam. Soft, without evidence of fluid collection. No erythema or signs of infection.     Horizontal lengths both 11 inches  Abdomen: Incision well healed without hypertrophic scarring. Expected post-operative edema noted. No palpable masses but there is a non tender lower central firm bulge. She has no difficulty with doing a quick full sit up.  (She states it is easier now to do a sit up with out all of the excess skin and fat of the lower abdomen). No erythema or signs of infection. No palpable hematoma or fluid collection.         CT ABDOMEN PELVIS W CONTRAST     HISTORY:  Abdominal pain, hernia suspected, r/o hernia, Do CT with valsalva  maneuver and oral contrast.     DATE: 3/7/2023 9:40 AM     TECHNIQUE:  Multiple contiguous enhanced axial sections through the abdomen  and pelvis were obtained.  Parasagittal and coronal reformatted images are  reviewed. Study was performed while the patient was performing a Valsalva  maneuver.     INTRAVENOUS CONTRAST TYPE: Omnipaque 300  INTRAVENOUS CONTRAST VOLUME: 100 cc  ORAL CONTRAST:  NONE     COMPARISON:  8/19/2022     FINDINGS:      LIVER: Normal.     GALLBLADDER AND BILIARY TREE:  No calcified gallstones. Normal caliber  wall.   No intrahepatic or extrahepatic biliary ductal dilatation.     KIDNEYS: Normal.     SPLEEN: Normal.     ADRENALS: Normal.     PANCREAS: Normal.     SMALL BOWEL: Normal.     COLON:  Normal. The appendix appears normal..     LYMPH NODES: There is no retroperitoneal, portahepatis or mesenteric  adenopathy.     PERITONEUM:No ascites or free air. No other fluid collection.     VESSELS: Normal     PELVIS: Bilateral ovarian cysts are  SUBJECTIVE: Patient seen and examined bedside by chief resident. Patient reports feeling okay this morning, feeling a bit weak. Patient reports no further bowel movements since admission, billy N/V, CP, SOB.      MEDICATIONS  (PRN):  acetaminophen   IVPB .. 1000 milliGRAM(s) IV Intermittent once PRN Mild Pain (1 - 3), Moderate Pain (4 - 6), Severe Pain (7 - 10)  ondansetron Injectable 4 milliGRAM(s) IV Push every 6 hours PRN Nausea      I&O's Detail    06 Sep 2024 07:01  -  07 Sep 2024 07:00  --------------------------------------------------------  IN:    Lactated Ringers: 1300 mL  Total IN: 1300 mL    OUT:    Voided (mL): 150 mL  Total OUT: 150 mL    Total NET: 1150 mL      07 Sep 2024 07:01  -  07 Sep 2024 08:59  --------------------------------------------------------  IN:    Lactated Ringers: 130 mL  Total IN: 130 mL    OUT:  Total OUT: 0 mL    Total NET: 130 mL          Vital Signs Last 24 Hrs  T(C): 36.5 (07 Sep 2024 08:35), Max: 36.8 (06 Sep 2024 12:52)  T(F): 97.7 (07 Sep 2024 08:35), Max: 98.3 (06 Sep 2024 12:52)  HR: 77 (07 Sep 2024 08:35) (66 - 95)  BP: 110/70 (07 Sep 2024 08:35) (75/55 - 145/79)  BP(mean): 75 (07 Sep 2024 04:43) (75 - 75)  RR: 17 (07 Sep 2024 08:35) (16 - 20)  SpO2: 98% (07 Sep 2024 08:35) (96% - 99%)    Parameters below as of 07 Sep 2024 08:35  Patient On (Oxygen Delivery Method): room air        PHYSICAL EXAM:   Gen: NAD, resting comfortably   CV: NSR  Pulm: no respiratory distress on RA, CTAB   Abd: soft, ND, NTTP, no rebound or guarding   Ext: WWP, no edema   Neuro: motor/sensory grossly intact     LABS:                        9.5    8.47  )-----------( 125      ( 07 Sep 2024 05:30 )             29.2     09-07    143  |  112<H>  |  19  ----------------------------<  102<H>  4.0   |  24  |  0.69    Ca    9.6      07 Sep 2024 05:30  Phos  3.0     09-07  Mg     1.8     09-07        PT/INR - ( 06 Sep 2024 15:52 )   PT: 10.7 sec;   INR: 0.94          PTT - ( 06 Sep 2024 15:52 )  PTT:31.2 sec  CAPILLARY BLOOD GLUCOSE      POCT Blood Glucose.: 139 mg/dL (06 Sep 2024 15:38)        Urinalysis Basic - ( 07 Sep 2024 05:30 )    Color: x / Appearance: x / SG: x / pH: x  Gluc: 102 mg/dL / Ketone: x  / Bili: x / Urobili: x   Blood: x / Protein: x / Nitrite: x   Leuk Esterase: x / RBC: x / WBC x   Sq Epi: x / Non Sq Epi: x / Bacteria: x             RADIOLOGY & ADDITIONAL STUDIES:         present.     ABDOMINAL WALL: Postoperative changes of bilateral PATITO flap  reconstructions with interval development of a central, mid abdominal  subcutaneous elliptical form fluid collection measuring approximately9.2 x  7.4 x 2.9 cm in left right, craniocaudal and AP dimensions respectively  presumably corresponding to the area palpable concern. There is associated  moderate increased attenuation of the subcutaneous adipose tissue  particularly below the fluid collection, but no evidence to suggest ventral  wall hernia.     LUNG BASES:  The lung bases are clear.     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 Postoperative changes of a bilateral PATITO flap reconstruction with  development of a subcutaneous fluid collection at the surgical site, likely  representing a postoperative seroma versus hematoma. There is no evidence  to suggest ventral wall hernia.          ASSESSMENT:  Anita Villanueva is a 52 year old female who follows up in clinic today for evaluation. Doing well over all with excellent natural shape and symmetry all incisions healing nicely. 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   - Scar massage twice daily with white non scented lotion, mederma, or silicone gel. Wear silicone sheets at night.  - Therapy for decreased range of motion of bilateral upper extremities  - Continue to wear supportive bra and abdominal binder.   - Continue Tylenol for pain control  - Return to clinic in in April 11 with Dr. Silva for surgical follow-up s/p bilateral breast reconstruction with PATITO flaps, or sooner if needed.  - Call office with questions or concerns.    The patient indicated understanding of the diagnosis and agreed with the  plan of care.       30 minutes was spent with the patient discussing the above and more than half the time was spent counseling the patient.      Yoav Silva MD

## 2024-09-07 NOTE — CONSULT NOTE ADULT - SUBJECTIVE AND OBJECTIVE BOX
GASTROENTEROLOGY CONSULT NOTE  HPI: 64 year old female patient with PMH of HTN, Rheumatoid arthritis, internal and external hemorrhoids; and PSH of bilateral TKR. Patient came to ED with complaints of bloody stools for 3 days. Patient was to be discharged when felt dizzy, had another bloody bowel movement with blood clots and Hgb drop to 11.2 for 12.2 on admission. Patient refers no history of constipation, and not having active external hemorrhoids in over 10 years. Patient has no complaints of nausea, vomiting, abdominal pain or changes in bowel movements. Surgery consulted for possible active rectal bleeding.     GI was consulted for rectal bleeding. Patient reports that on Tuesday she had an episode of bright red blood on the toilet paper when wiping. No episodes of bleeding on Wednesday but on Thursday had 3-4 episodes of larger amounts of bright red blood w/ small blood clots mixed in. These episodes were associated w/ fecal urgency but no abdominal pain. Patient had similar episodes of painless bright red blood, 4-5 episodes on Friday. She has had no further BMs since then. No prior EGD or colonoscopy. Takes no AC, ASA, or NSAIDs.     PMH: HTN, Rheumatoid arthritis, internal and external hemorrhoids  PSH: bilateral TKR  Meds: methotrexate , hydroxychloroquine, losartan 100mg, metoprolol succinate 100mg, Hctz 25mg,   Allergies: Ciprofloxacin, Augmentin  Social; Social drinker  EGD: Denies  C-scope; Denies      T(C): 36.8 (09-06-24 @ 21:01), Max: 36.8 (09-06-24 @ 12:52)  HR: 76 (09-06-24 @ 21:01) (67 - 95)  BP: 123/85 (09-06-24 @ 21:01) (75/55 - 137/81)  RR: 20 (09-06-24 @ 21:01) (16 - 20)  SpO2: 99% (09-06-24 @ 21:01) (96% - 99%)      LABS:                        11.2   12.53 )-----------( 173      ( 06 Sep 2024 15:52 )             34.5     09-06    142  |  109<H>  |  20  ----------------------------<  141<H>  3.6   |  25  |  1.01    Ca    11.0<H>      06 Sep 2024 15:52    TPro  7.3  /  Alb  4.2  /  TBili  0.2  /  DBili  x   /  AST  21  /  ALT  <5<L>  /  AlkPhos  98  09-05    PT/INR - ( 06 Sep 2024 15:52 )   PT: 10.7 sec;   INR: 0.94          PTT - ( 06 Sep 2024 15:52 )  PTT:31.2 sec       (06 Sep 2024 21:31)    Allergies    adhesives (Rash)  Macrobid (Unknown)  Cipro (Rash)    Intolerances      Home Medications:    MEDICATIONS:  MEDICATIONS  (STANDING):  lactated ringers. 1000 milliLiter(s) (130 mL/Hr) IV Continuous <Continuous>  pantoprazole  Injectable 40 milliGRAM(s) IV Push daily    MEDICATIONS  (PRN):  ondansetron Injectable 4 milliGRAM(s) IV Push every 6 hours PRN Nausea    PAST MEDICAL & SURGICAL HISTORY:  Hypertension      RA (rheumatoid arthritis)      No significant past surgical history        FAMILY HISTORY:    SOCIAL HISTORY:  Tobacco: [ ] Current, [ ] Former, [ ] Never; Pack Years:  Alcohol:  Illicit Drugs:    REVIEW OF SYSTEMS:  All other 10 review of systems is negative unless indicated above.    Vital Signs Last 24 Hrs  T(C): 36.4 (07 Sep 2024 13:16), Max: 36.8 (06 Sep 2024 21:01)  T(F): 97.6 (07 Sep 2024 13:16), Max: 98.2 (06 Sep 2024 21:01)  HR: 73 (07 Sep 2024 13:16) (66 - 79)  BP: 143/81 (07 Sep 2024 13:16) (99/63 - 145/79)  BP(mean): 75 (07 Sep 2024 04:43) (75 - 75)  RR: 18 (07 Sep 2024 13:16) (16 - 20)  SpO2: 98% (07 Sep 2024 13:16) (97% - 99%)    Parameters below as of 07 Sep 2024 13:16  Patient On (Oxygen Delivery Method): room air        09-06 @ 07:01  -  09-07 @ 07:00  --------------------------------------------------------  IN: 1300 mL / OUT: 150 mL / NET: 1150 mL    09-07 @ 07:01  -  09-07 @ 15:51  --------------------------------------------------------  IN: 780 mL / OUT: 0 mL / NET: 780 mL        PHYSICAL EXAM:    General: lying in bed, in no acute distress  HEENT: Neck supple, mmm, no jvd  Lungs: Normal respiratory effort, no intercostal retractions  Cardiovascular: regular rate  Abdomen: Soft, non-tender non-distended; No rebound or guarding  Neurological: CHEN, speech fluent  Skin: Warm and dry. No obvious rash    LABS:                        9.6    6.97  )-----------( 126      ( 07 Sep 2024 11:53 )             29.5     09-07    143  |  112<H>  |  19  ----------------------------<  102<H>  4.0   |  24  |  0.69    Ca    9.6      07 Sep 2024 05:30  Phos  3.0     09-07  Mg     1.8     09-07          PT/INR - ( 06 Sep 2024 15:52 )   PT: 10.7 sec;   INR: 0.94          PTT - ( 06 Sep 2024 15:52 )  PTT:31.2 sec    RADIOLOGY & ADDITIONAL STUDIES:     Reviewed

## 2024-09-07 NOTE — CONSULT NOTE ADULT - SUBJECTIVE AND OBJECTIVE BOX
HOLLEY BEARD  64y  Female      Patient is a 64y old  Female who presents with a chief complaint of Rectal bleeding (07 Sep 2024 06:15)        PAST MEDICAL/SURGICAL HISTORY  PAST MEDICAL & SURGICAL HISTORY:  Hypertension      RA (rheumatoid arthritis)      No significant past surgical history          REVIEW OF SYSTEMS:  CONSTITUTIONAL: No fever, weight loss, or fatigue  EYES: No eye pain, visual disturbances, or discharge  ENMT:  No difficulty hearing, tinnitus, vertigo; No sinus or throat pain  NECK: No pain or stiffness  BREASTS: No pain, masses, or nipple discharge  RESPIRATORY: No cough, wheezing, chills or hemoptysis; No shortness of breath  CARDIOVASCULAR: No chest pain, palpitations, dizziness, or leg swelling  GASTROINTESTINAL: No abdominal or epigastric pain. No nausea, vomiting, or hematemesis; No diarrhea or constipation. No melena or hematochezia.  GENITOURINARY: No dysuria, frequency, hematuria, or incontinence  NEUROLOGICAL: No headaches, memory loss, loss of strength, numbness, or tremors  SKIN: No itching, burning, rashes, or lesions   LYMPH NODES: No enlarged glands  ENDOCRINE: No heat or cold intolerance; No hair loss  MUSCULOSKELETAL: No joint pain or swelling; No muscle, back, or extremity pain  PSYCHIATRIC: No depression, anxiety, mood swings, or difficulty sleeping  HEME/LYMPH: No easy bruising, or bleeding gums  ALLERY AND IMMUNOLOGIC: No hives or eczema    T(C): 36.5 (09-07-24 @ 08:35), Max: 36.8 (09-06-24 @ 21:01)  HR: 77 (09-07-24 @ 08:35) (66 - 92)  BP: 110/70 (09-07-24 @ 08:35) (75/55 - 145/79)  RR: 17 (09-07-24 @ 08:35) (16 - 20)  SpO2: 98% (09-07-24 @ 08:35) (96% - 99%)  Wt(kg): --Vital Signs Last 24 Hrs  T(C): 36.5 (07 Sep 2024 08:35), Max: 36.8 (06 Sep 2024 21:01)  T(F): 97.7 (07 Sep 2024 08:35), Max: 98.2 (06 Sep 2024 21:01)  HR: 77 (07 Sep 2024 08:35) (66 - 92)  BP: 110/70 (07 Sep 2024 08:35) (75/55 - 145/79)  BP(mean): 75 (07 Sep 2024 04:43) (75 - 75)  RR: 17 (07 Sep 2024 08:35) (16 - 20)  SpO2: 98% (07 Sep 2024 08:35) (96% - 99%)    Parameters below as of 07 Sep 2024 08:35  Patient On (Oxygen Delivery Method): room air        PHYSICAL EXAM:  GENERAL: NAD, well-groomed, well-developed  HEAD:  Atraumatic, Normocephalic  EYES: EOMI, PERRLA, conjunctiva and sclera clear  ENMT: No tonsillar erythema, exudates, or enlargement; Moist mucous membranes, Good dentition, No lesions  NECK: Supple, No JVD, Normal thyroid  NERVOUS SYSTEM:  Alert & Oriented X3, Good concentration; Motor Strength 5/5 B/L upper and lower extremities; DTRs 2+ intact and symmetric  CHEST/LUNG: Clear to percussion bilaterally; No rales, rhonchi, wheezing, or rubs  HEART: Regular rate and rhythm; No murmurs, rubs, or gallops  ABDOMEN: Soft, Nontender, Nondistended; Bowel sounds present  EXTREMITIES:  2+ Peripheral Pulses, No clubbing, cyanosis, or edema  LYMPH: No lymphadenopathy noted  SKIN: No rashes or lesions    Consultant(s) Notes Reviewed:  [x ] YES  [ ] NO  Care Discussed with Consultants/Other Providers [ x] YES  [ ] NO    LABS:  CBC   09-07-24 @ 11:53  Hematcorit 29.5  Hemoglobin 9.6  Mean Cell Hemoglobin 29.4  Platelet Count-Automated 126  RBC Count 3.26  Red Cell Distrib Width 14.9  Wbc Count 6.97  09-07-24 @ 05:30  Hematcorit 29.2  Hemoglobin 9.5  Mean Cell Hemoglobin 30.4  Platelet Count-Automated 125  RBC Count 3.12  Red Cell Distrib Width 15.1  Wbc Count 8.47  09-06-24 @ 21:46  Hematcorit 32.5  Hemoglobin 10.4  Mean Cell Hemoglobin 29.9  Platelet Count-Automated 111  RBC Count 3.48  Red Cell Distrib Width 14.9  Wbc Count 10.93  09-06-24 @ 15:52  Hematcorit 34.5  Hemoglobin 11.2  Mean Cell Hemoglobin 28.9  Platelet Count-Automated 173  RBC Count 3.88  Red Cell Distrib Width 14.7  Wbc Count 12.53  09-06-24 @ 13:42  Hematcorit 37.2  Hemoglobin 12.2  Mean Cell Hemoglobin 29.4  Platelet Count-Automated 163  RBC Count 4.15  Red Cell Distrib Width 14.8  Wbc Count 9.20      BMP  09-07-24 @ 05:30  Anion Gap. Serum 7  Blood Urea Nitrogen,Serm 19  Calcium, Total Serum 9.6  Carbon Dioxide, Serum 24  Chloride, Serum 112  Creatinine, Serum 0.69  eGFR in  --  eGFR in Non Afican American --  Gloucose, serum 102  Potassium, Serum 4.0  Sodium, Serum 143              09-06-24 @ 15:52  Anion Gap. Serum 8  Blood Urea Nitrogen,Serm 20  Calcium, Total Serum 11.0  Carbon Dioxide, Serum 25  Chloride, Serum 109  Creatinine, Serum 1.01  eGFR in  --  eGFR in Non Afican American --  Gloucose, serum 141  Potassium, Serum 3.6  Sodium, Serum 142              09-06-24 @ 13:42  Anion Gap. Serum 5  Blood Urea Nitrogen,Serm 18  Calcium, Total Serum 11.3  Carbon Dioxide, Serum 30  Chloride, Serum 109  Creatinine, Serum 0.81  eGFR in  --  eGFR in Non Afican American --  Gloucose, serum 108  Potassium, Serum 4.0  Sodium, Serum 144              09-05-24 @ 00:58  Anion Gap. Serum 9  Blood Urea Nitrogen,Serm 21  Calcium, Total Serum 10.4  Carbon Dioxide, Serum 25  Chloride, Serum 108  Creatinine, Serum 0.79  eGFR in  --  eGFR in Non Afican American --  Gloucose, serum 130  Potassium, Serum 3.9  Sodium, Serum 142                  CMP  09-07-24 @ 05:30  Diamond Aminotransferase(ALT/SGPT)--  Albumin, Serum --  Alkaline Phosphatase, Serum --  Anion Gap, Serum 7  Aspartate Aminotransferase (AST/SGOT)--  Bilirubin Total, Serum --  Blood Urea Nitrogen, Serum 19  Calcium,Total Serum 9.6  Carbon Dioxide, Serum 24  Chloride, Serum 112  Creatinine, Serum 0.69  eGFR if  --  eGFR if Non African American --  Glucose, Serum 102  Potassium, Serum 4.0  Protein Total, Serum --  Sodium, Serum 143                      09-06-24 @ 15:52  Diamond Aminotransferase(ALT/SGPT)--  Albumin, Serum --  Alkaline Phosphatase, Serum --  Anion Gap, Serum 8  Aspartate Aminotransferase (AST/SGOT)--  Bilirubin Total, Serum --  Blood Urea Nitrogen, Serum 20  Calcium,Total Serum 11.0  Carbon Dioxide, Serum 25  Chloride, Serum 109  Creatinine, Serum 1.01  eGFR if  --  eGFR if Non African American --  Glucose, Serum 141  Potassium, Serum 3.6  Protein Total, Serum --  Sodium, Serum 142                      09-06-24 @ 13:42  Diamond Aminotransferase(ALT/SGPT)--  Albumin, Serum --  Alkaline Phosphatase, Serum --  Anion Gap, Serum 5  Aspartate Aminotransferase (AST/SGOT)--  Bilirubin Total, Serum --  Blood Urea Nitrogen, Serum 18  Calcium,Total Serum 11.3  Carbon Dioxide, Serum 30  Chloride, Serum 109  Creatinine, Serum 0.81  eGFR if  --  eGFR if Non African American --  Glucose, Serum 108  Potassium, Serum 4.0  Protein Total, Serum --  Sodium, Serum 144                      09-05-24 @ 00:58  Diamond Aminotransferase(ALT/SGPT)<5  Albumin, Serum 4.2  Alkaline Phosphatase, Serum 98  Anion Gap, Serum 9  Aspartate Aminotransferase (AST/SGOT)21  Bilirubin Total, Serum 0.2  Blood Urea Nitrogen, Serum 21  Calcium,Total Serum 10.4  Carbon Dioxide, Serum 25  Chloride, Serum 108  Creatinine, Serum 0.79  eGFR if  --  eGFR if Non African American --  Glucose, Serum 130  Potassium, Serum 3.9  Protein Total, Serum 7.3  Sodium, Serum 142                          PT/INR  PT/INR  09-06-24 @ 15:52  INR 0.94  Prothrombin Time Comment --  Prothrobin Time, [id number]      Amylase/Lipase  09-05-24 @ 00:58  Amylase, Serum Total --  Lipase, Serum 34            RADIOLOGY & ADDITIONAL TESTS:    Imaging Personally Reviewed:  [ ] YES  [ ] NO HOLLEY BEARD  64y  Female      Patient is a 64y old  Female who presents with a chief complaint of Rectal bleeding (07 Sep 2024 06:15)        PAST MEDICAL/SURGICAL HISTORY  PAST MEDICAL & SURGICAL HISTORY:  Hypertension      RA (rheumatoid arthritis)      No significant past surgical history          REVIEW OF SYSTEMS:  CONSTITUTIONAL: No fever, weight loss, or fatigue  EYES: No eye pain, visual disturbances, or discharge  ENMT:  No difficulty hearing, tinnitus, vertigo; No sinus or throat pain  NECK: No pain or stiffness  BREASTS: No pain, masses, or nipple discharge  RESPIRATORY: No cough, wheezing, chills or hemoptysis; No shortness of breath  CARDIOVASCULAR: No chest pain, palpitations, dizziness, or leg swelling  GASTROINTESTINAL: No abdominal or epigastric pain. No nausea, vomiting, or hematemesis; No diarrhea or constipation. Hematochezia  GENITOURINARY: No dysuria, frequency, hematuria, or incontinence  NEUROLOGICAL: No headaches, memory loss, loss of strength, numbness, or tremors  SKIN: No itching, burning, rashes, or lesions   LYMPH NODES: No enlarged glands  ENDOCRINE: No heat or cold intolerance; No hair loss  MUSCULOSKELETAL: No joint pain or swelling; No muscle, back, or extremity pain  PSYCHIATRIC: No depression, anxiety, mood swings, or difficulty sleeping  HEME/LYMPH: No easy bruising, or bleeding gums  ALLERY AND IMMUNOLOGIC: No hives or eczema    T(C): 36.5 (09-07-24 @ 08:35), Max: 36.8 (09-06-24 @ 21:01)  HR: 77 (09-07-24 @ 08:35) (66 - 92)  BP: 110/70 (09-07-24 @ 08:35) (75/55 - 145/79)  RR: 17 (09-07-24 @ 08:35) (16 - 20)  SpO2: 98% (09-07-24 @ 08:35) (96% - 99%)  Wt(kg): --Vital Signs Last 24 Hrs  T(C): 36.5 (07 Sep 2024 08:35), Max: 36.8 (06 Sep 2024 21:01)  T(F): 97.7 (07 Sep 2024 08:35), Max: 98.2 (06 Sep 2024 21:01)  HR: 77 (07 Sep 2024 08:35) (66 - 92)  BP: 110/70 (07 Sep 2024 08:35) (75/55 - 145/79)  BP(mean): 75 (07 Sep 2024 04:43) (75 - 75)  RR: 17 (07 Sep 2024 08:35) (16 - 20)  SpO2: 98% (07 Sep 2024 08:35) (96% - 99%)    Parameters below as of 07 Sep 2024 08:35  Patient On (Oxygen Delivery Method): room air        PHYSICAL EXAM:  GENERAL: NAD, well-groomed, well-developed  HEAD:  Atraumatic, Normocephalic  EYES: EOMI, PERRLA, conjunctiva and sclera clear  ENMT: No tonsillar erythema, exudates, or enlargement; Moist mucous membranes, Good dentition, No lesions  NECK: Supple, No JVD, Normal thyroid  NERVOUS SYSTEM:  Alert & Oriented X3, Good concentration; Motor Strength 5/5 B/L upper and lower extremities; DTRs 2+ intact and symmetric  CHEST/LUNG: Clear to percussion bilaterally; No rales, rhonchi, wheezing, or rubs  HEART: Regular rate and rhythm; No murmurs, rubs, or gallops  ABDOMEN: Soft, Nontender, Nondistended; Bowel sounds present  EXTREMITIES:  2+ Peripheral Pulses, No clubbing, cyanosis, or edema  LYMPH: No lymphadenopathy noted  SKIN: No rashes or lesions    Consultant(s) Notes Reviewed:  [x ] YES  [ ] NO  Care Discussed with Consultants/Other Providers [ x] YES  [ ] NO    LABS:  CBC   09-07-24 @ 11:53  Hematcorit 29.5  Hemoglobin 9.6  Mean Cell Hemoglobin 29.4  Platelet Count-Automated 126  RBC Count 3.26  Red Cell Distrib Width 14.9  Wbc Count 6.97  09-07-24 @ 05:30  Hematcorit 29.2  Hemoglobin 9.5  Mean Cell Hemoglobin 30.4  Platelet Count-Automated 125  RBC Count 3.12  Red Cell Distrib Width 15.1  Wbc Count 8.47  09-06-24 @ 21:46  Hematcorit 32.5  Hemoglobin 10.4  Mean Cell Hemoglobin 29.9  Platelet Count-Automated 111  RBC Count 3.48  Red Cell Distrib Width 14.9  Wbc Count 10.93  09-06-24 @ 15:52  Hematcorit 34.5  Hemoglobin 11.2  Mean Cell Hemoglobin 28.9  Platelet Count-Automated 173  RBC Count 3.88  Red Cell Distrib Width 14.7  Wbc Count 12.53  09-06-24 @ 13:42  Hematcorit 37.2  Hemoglobin 12.2  Mean Cell Hemoglobin 29.4  Platelet Count-Automated 163  RBC Count 4.15  Red Cell Distrib Width 14.8  Wbc Count 9.20      BMP  09-07-24 @ 05:30  Anion Gap. Serum 7  Blood Urea Nitrogen,Serm 19  Calcium, Total Serum 9.6  Carbon Dioxide, Serum 24  Chloride, Serum 112  Creatinine, Serum 0.69  eGFR in  --  eGFR in Non Afican American --  Gloucose, serum 102  Potassium, Serum 4.0  Sodium, Serum 143              09-06-24 @ 15:52  Anion Gap. Serum 8  Blood Urea Nitrogen,Serm 20  Calcium, Total Serum 11.0  Carbon Dioxide, Serum 25  Chloride, Serum 109  Creatinine, Serum 1.01  eGFR in  --  eGFR in Non Afican American --  Gloucose, serum 141  Potassium, Serum 3.6  Sodium, Serum 142              09-06-24 @ 13:42  Anion Gap. Serum 5  Blood Urea Nitrogen,Serm 18  Calcium, Total Serum 11.3  Carbon Dioxide, Serum 30  Chloride, Serum 109  Creatinine, Serum 0.81  eGFR in  --  eGFR in Non Afican American --  Gloucose, serum 108  Potassium, Serum 4.0  Sodium, Serum 144              09-05-24 @ 00:58  Anion Gap. Serum 9  Blood Urea Nitrogen,Serm 21  Calcium, Total Serum 10.4  Carbon Dioxide, Serum 25  Chloride, Serum 108  Creatinine, Serum 0.79  eGFR in  --  eGFR in Non Afican American --  Gloucose, serum 130  Potassium, Serum 3.9  Sodium, Serum 142                  CMP  09-07-24 @ 05:30  Diamond Aminotransferase(ALT/SGPT)--  Albumin, Serum --  Alkaline Phosphatase, Serum --  Anion Gap, Serum 7  Aspartate Aminotransferase (AST/SGOT)--  Bilirubin Total, Serum --  Blood Urea Nitrogen, Serum 19  Calcium,Total Serum 9.6  Carbon Dioxide, Serum 24  Chloride, Serum 112  Creatinine, Serum 0.69  eGFR if  --  eGFR if Non African American --  Glucose, Serum 102  Potassium, Serum 4.0  Protein Total, Serum --  Sodium, Serum 143                      09-06-24 @ 15:52  Diamond Aminotransferase(ALT/SGPT)--  Albumin, Serum --  Alkaline Phosphatase, Serum --  Anion Gap, Serum 8  Aspartate Aminotransferase (AST/SGOT)--  Bilirubin Total, Serum --  Blood Urea Nitrogen, Serum 20  Calcium,Total Serum 11.0  Carbon Dioxide, Serum 25  Chloride, Serum 109  Creatinine, Serum 1.01  eGFR if  --  eGFR if Non African American --  Glucose, Serum 141  Potassium, Serum 3.6  Protein Total, Serum --  Sodium, Serum 142                      09-06-24 @ 13:42  Diamond Aminotransferase(ALT/SGPT)--  Albumin, Serum --  Alkaline Phosphatase, Serum --  Anion Gap, Serum 5  Aspartate Aminotransferase (AST/SGOT)--  Bilirubin Total, Serum --  Blood Urea Nitrogen, Serum 18  Calcium,Total Serum 11.3  Carbon Dioxide, Serum 30  Chloride, Serum 109  Creatinine, Serum 0.81  eGFR if  --  eGFR if Non African American --  Glucose, Serum 108  Potassium, Serum 4.0  Protein Total, Serum --  Sodium, Serum 144                      09-05-24 @ 00:58  Diamond Aminotransferase(ALT/SGPT)<5  Albumin, Serum 4.2  Alkaline Phosphatase, Serum 98  Anion Gap, Serum 9  Aspartate Aminotransferase (AST/SGOT)21  Bilirubin Total, Serum 0.2  Blood Urea Nitrogen, Serum 21  Calcium,Total Serum 10.4  Carbon Dioxide, Serum 25  Chloride, Serum 108  Creatinine, Serum 0.79  eGFR if  --  eGFR if Non African American --  Glucose, Serum 130  Potassium, Serum 3.9  Protein Total, Serum 7.3  Sodium, Serum 142                          PT/INR  PT/INR  09-06-24 @ 15:52  INR 0.94  Prothrombin Time Comment --  Prothrobin Time, [id number]      Amylase/Lipase  09-05-24 @ 00:58  Amylase, Serum Total --  Lipase, Serum 34            RADIOLOGY & ADDITIONAL TESTS:    Imaging Personally Reviewed:  [ ] YES  [ ] NO

## 2024-09-08 LAB
ANION GAP SERPL CALC-SCNC: 8 MMOL/L — SIGNIFICANT CHANGE UP (ref 5–17)
BLD GP AB SCN SERPL QL: NEGATIVE — SIGNIFICANT CHANGE UP
BUN SERPL-MCNC: 12 MG/DL — SIGNIFICANT CHANGE UP (ref 7–23)
CALCIUM SERPL-MCNC: 9.6 MG/DL — SIGNIFICANT CHANGE UP (ref 8.4–10.5)
CHLORIDE SERPL-SCNC: 111 MMOL/L — HIGH (ref 96–108)
CO2 SERPL-SCNC: 26 MMOL/L — SIGNIFICANT CHANGE UP (ref 22–31)
CREAT SERPL-MCNC: 0.63 MG/DL — SIGNIFICANT CHANGE UP (ref 0.5–1.3)
EGFR: 100 ML/MIN/1.73M2 — SIGNIFICANT CHANGE UP
GLUCOSE SERPL-MCNC: 113 MG/DL — HIGH (ref 70–99)
HCT VFR BLD CALC: 29.4 % — LOW (ref 34.5–45)
HGB BLD-MCNC: 9.3 G/DL — LOW (ref 11.5–15.5)
MAGNESIUM SERPL-MCNC: 1.8 MG/DL — SIGNIFICANT CHANGE UP (ref 1.6–2.6)
MCHC RBC-ENTMCNC: 29 PG — SIGNIFICANT CHANGE UP (ref 27–34)
MCHC RBC-ENTMCNC: 31.6 GM/DL — LOW (ref 32–36)
MCV RBC AUTO: 91.6 FL — SIGNIFICANT CHANGE UP (ref 80–100)
NRBC # BLD: 0 /100 WBCS — SIGNIFICANT CHANGE UP (ref 0–0)
PHOSPHATE SERPL-MCNC: 2 MG/DL — LOW (ref 2.5–4.5)
PLATELET # BLD AUTO: 147 K/UL — LOW (ref 150–400)
POTASSIUM SERPL-MCNC: 3.7 MMOL/L — SIGNIFICANT CHANGE UP (ref 3.5–5.3)
POTASSIUM SERPL-SCNC: 3.7 MMOL/L — SIGNIFICANT CHANGE UP (ref 3.5–5.3)
RBC # BLD: 3.21 M/UL — LOW (ref 3.8–5.2)
RBC # FLD: 14.9 % — HIGH (ref 10.3–14.5)
RH IG SCN BLD-IMP: POSITIVE — SIGNIFICANT CHANGE UP
SODIUM SERPL-SCNC: 145 MMOL/L — SIGNIFICANT CHANGE UP (ref 135–145)
WBC # BLD: 7.08 K/UL — SIGNIFICANT CHANGE UP (ref 3.8–10.5)
WBC # FLD AUTO: 7.08 K/UL — SIGNIFICANT CHANGE UP (ref 3.8–10.5)

## 2024-09-08 PROCEDURE — 99232 SBSQ HOSP IP/OBS MODERATE 35: CPT

## 2024-09-08 RX ORDER — METHOTREXATE SODIUM 2.5 MG
0 TABLET ORAL
Refills: 0 | DISCHARGE

## 2024-09-08 RX ORDER — POLYETHYLENE GLYCOL 3350, SODIUM SULFATE ANHYDROUS, SODIUM BICARBONATE, SODIUM CHLORIDE, POTASSIUM CHLORIDE 236; 22.74; 6.74; 5.86; 2.97 G/4L; G/4L; G/4L; G/4L; G/4L
4000 POWDER, FOR SOLUTION ORAL ONCE
Refills: 0 | Status: DISCONTINUED | OUTPATIENT
Start: 2024-09-08 | End: 2024-09-08

## 2024-09-08 RX ORDER — LOSARTAN POTASSIUM 50 MG/1
1 TABLET ORAL
Refills: 0 | DISCHARGE

## 2024-09-08 RX ORDER — SODIUM PHOSPHATE, DIBASIC, ANHYDROUS, POTASSIUM PHOSPHATE, MONOBASIC, AND SODIUM PHOSPHATE, MONOBASIC, MONOHYDRATE 852; 155; 130 MG/1; MG/1; MG/1
3 TABLET, COATED ORAL ONCE
Refills: 0 | Status: COMPLETED | OUTPATIENT
Start: 2024-09-08 | End: 2024-09-08

## 2024-09-08 RX ORDER — HYDROCHLOROTHIAZIDE 12.5 MG/1
1 CAPSULE ORAL
Refills: 0 | DISCHARGE

## 2024-09-08 RX ORDER — POTASSIUM PHOSPHATE 236; 224 MG/ML; MG/ML
21 INJECTION, SOLUTION INTRAVENOUS ONCE
Refills: 0 | Status: DISCONTINUED | OUTPATIENT
Start: 2024-09-08 | End: 2024-09-08

## 2024-09-08 RX ORDER — POLYETHYLENE GLYCOL 3350, SODIUM SULFATE ANHYDROUS, SODIUM BICARBONATE, SODIUM CHLORIDE, POTASSIUM CHLORIDE 236; 22.74; 6.74; 5.86; 2.97 G/4L; G/4L; G/4L; G/4L; G/4L
4000 POWDER, FOR SOLUTION ORAL ONCE
Refills: 0 | Status: COMPLETED | OUTPATIENT
Start: 2024-09-08 | End: 2024-09-08

## 2024-09-08 RX ADMIN — Medication 40 MILLIGRAM(S): at 11:46

## 2024-09-08 RX ADMIN — Medication 5 MILLIGRAM(S): at 23:49

## 2024-09-08 RX ADMIN — SODIUM PHOSPHATE, DIBASIC, ANHYDROUS, POTASSIUM PHOSPHATE, MONOBASIC, AND SODIUM PHOSPHATE, MONOBASIC, MONOHYDRATE 3 PACKET(S): 852; 155; 130 TABLET, COATED ORAL at 13:11

## 2024-09-08 RX ADMIN — POLYETHYLENE GLYCOL 3350, SODIUM SULFATE ANHYDROUS, SODIUM BICARBONATE, SODIUM CHLORIDE, POTASSIUM CHLORIDE 4000 MILLILITER(S): 236; 22.74; 6.74; 5.86; 2.97 POWDER, FOR SOLUTION ORAL at 14:29

## 2024-09-08 NOTE — HISTORY OF PRESENT ILLNESS
[FreeTextEntry1] : HPI- 64F PMHx RA, GERD, HTN  Referred by -   PMHx - RA, GERD, HTN PSHx - no prior  Rx - Hydroxychloroquine, Prednisone, Metoprolol Supplements/herbs/OTC - Vit D, Vit C, FA A/C or NSAIDs? -  FMHx - Allergies - Augmentin, CIpro EtOH - socially Smoking - No Drugs -  Diet -  Labs/Stool Tests - Breath Tests -  Imaging -  EGD -  Colonoscopy -

## 2024-09-08 NOTE — PROGRESS NOTE ADULT - SUBJECTIVE AND OBJECTIVE BOX
GASTROENTEROLOGY PROGRESS NOTE  Patient seen and examined at bedside. Patient had one brown BM today. No episodes of melena or hematochezia.     PERTINENT REVIEW OF SYSTEMS:  CONSTITUTIONAL: No weakness, fevers or chills  HEENT: No visual changes; No vertigo or throat pain   GASTROINTESTINAL: As above.  NEUROLOGICAL: No numbness or weakness  SKIN: No itching, burning, rashes, or lesions     Allergies    adhesives (Rash)  Macrobid (Unknown)  Cipro (Rash)    Intolerances      MEDICATIONS:  MEDICATIONS  (STANDING):  lactated ringers. 1000 milliLiter(s) (140 mL/Hr) IV Continuous <Continuous>  pantoprazole  Injectable 40 milliGRAM(s) IV Push daily    MEDICATIONS  (PRN):  ondansetron Injectable 4 milliGRAM(s) IV Push every 6 hours PRN Nausea    Vital Signs Last 24 Hrs  T(C): 36.7 (08 Sep 2024 13:19), Max: 36.9 (07 Sep 2024 16:32)  T(F): 98.1 (08 Sep 2024 13:19), Max: 98.4 (07 Sep 2024 16:32)  HR: 85 (08 Sep 2024 14:53) (77 - 87)  BP: 147/83 (08 Sep 2024 14:53) (118/68 - 163/81)  BP(mean): --  RR: 18 (08 Sep 2024 13:19) (17 - 18)  SpO2: 97% (08 Sep 2024 13:19) (96% - 100%)    Parameters below as of 08 Sep 2024 13:19  Patient On (Oxygen Delivery Method): room air        09-07 @ 07:01 - 09-08 @ 07:00  --------------------------------------------------------  IN: 2860 mL / OUT: 0 mL / NET: 2860 mL    09-08 @ 07:01  -  09-08 @ 15:22  --------------------------------------------------------  IN: 820 mL / OUT: 1400 mL / NET: -580 mL      PHYSICAL EXAM:    General: lying in bed, in no acute distress  HEENT: MMM, conjunctiva and sclera clear  Gastrointestinal: Soft non-tender non-distended; No rebound or guarding  Skin: Warm and dry. No obvious rash    LABS:                        9.3    7.08  )-----------( 147      ( 08 Sep 2024 05:30 )             29.4     09-08    145  |  111<H>  |  12  ----------------------------<  113<H>  3.7   |  26  |  0.63    Ca    9.6      08 Sep 2024 05:30  Phos  2.0     09-08  Mg     1.8     09-08      PT/INR - ( 06 Sep 2024 15:52 )   PT: 10.7 sec;   INR: 0.94          PTT - ( 06 Sep 2024 15:52 )  PTT:31.2 sec      Urinalysis Basic - ( 08 Sep 2024 05:30 )    Color: x / Appearance: x / SG: x / pH: x  Gluc: 113 mg/dL / Ketone: x  / Bili: x / Urobili: x   Blood: x / Protein: x / Nitrite: x   Leuk Esterase: x / RBC: x / WBC x   Sq Epi: x / Non Sq Epi: x / Bacteria: x                RADIOLOGY & ADDITIONAL STUDIES:  Reviewed

## 2024-09-08 NOTE — PROGRESS NOTE ADULT - NUTRITIONAL ASSESSMENT
64 year old female patient with PMH of HTN, Rheumatoid arthritis, internal and external hemorrhoids who presented w/ multiple episodes of rectal bleeding.    #Hematochezia  #LGIB  - Patient w/ multiple episodes of hematochezia c/f LGIB. Most likely 2/2 diverticular bleed given description of symptoms but patient has not had prior endoscopic evaluation so warrants colonoscopic evaluation to assess for other causes of hematochezia. LGIB seems to have stopped as patient has had no further episodes of hematochezia  Recommendations:  - Trend CBC, maintain active T&S  - Maintain 2 large bore IVs  - Clear liquid diet today  - Golytely 4 L today, if BMs not clear after completing 4 L Golytely then recommend additional 1-2 L of Golytely  - Plan for colonoscopy on 9/9  - NPO at midnight, send pre op labs     Case discussed w/ GI attending Dr. Emma Moore MD  PGY-4 GI Fellow  GI consult pager (M-F [id number]): [phone]  Please call  for on-call fellow after hours

## 2024-09-08 NOTE — PROGRESS NOTE ADULT - ATTENDING COMMENTS
64F w/ Hx of RA presenting with painless hematochezia x 3 days and lightheadedness. HD stable on admission but HB dropped ~12 -> 9.5 -> 9.6.     Today continues to feel well, without e/o further overt bleeding. HB stable. Planned for COL tomorrow.

## 2024-09-08 NOTE — PROGRESS NOTE ADULT - ATTENDING COMMENTS
No acute issues.  No blood per rectum.   H&H is stable.  Being bowel prepped for colonoscopy tomorrow.

## 2024-09-08 NOTE — PROGRESS NOTE ADULT - SUBJECTIVE AND OBJECTIVE BOX
Patient is a 64y old  Female who presents with a chief complaint of Rectal bleeding (08 Sep 2024 08:02)      SUBJECTIVE / OVERNIGHT EVENTS:  Overall, feels improved.   No further hematochezia present.   Golytely to be started today.  NPO past midnight for colonoscopy.     MEDICATIONS  (STANDING):  lactated ringers. 1000 milliLiter(s) (140 mL/Hr) IV Continuous <Continuous>  pantoprazole  Injectable 40 milliGRAM(s) IV Push daily  polyethylene glycol/electrolyte Solution. 4000 milliLiter(s) Oral once    MEDICATIONS  (PRN):  ondansetron Injectable 4 milliGRAM(s) IV Push every 6 hours PRN Nausea      CAPILLARY BLOOD GLUCOSE        I&O's Summary    07 Sep 2024 07:01  -  08 Sep 2024 07:00  --------------------------------------------------------  IN: 2860 mL / OUT: 0 mL / NET: 2860 mL    08 Sep 2024 07:01  -  08 Sep 2024 11:48  --------------------------------------------------------  IN: 460 mL / OUT: 700 mL / NET: -240 mL        PHYSICAL EXAM:  Vital Signs Last 24 Hrs  T(C): 36.8 (08 Sep 2024 08:32), Max: 36.9 (07 Sep 2024 16:32)  T(F): 98.2 (08 Sep 2024 08:32), Max: 98.4 (07 Sep 2024 16:32)  HR: 83 (08 Sep 2024 08:32) (73 - 83)  BP: 152/82 (08 Sep 2024 08:32) (118/68 - 152/82)  BP(mean): --  RR: 17 (08 Sep 2024 08:32) (17 - 18)  SpO2: 100% (08 Sep 2024 08:32) (96% - 100%)    Parameters below as of 08 Sep 2024 08:32  Patient On (Oxygen Delivery Method): room air      GENERAL: NAD, well-developed  HEAD:  Atraumatic, Normocephalic  EYES: EOMI, PERRLA, conjunctiva and sclera clear  NECK: Supple, No JVD  CHEST/LUNG: Clear to auscultation bilaterally; No wheeze  HEART: Regular rate and rhythm; No murmurs, rubs, or gallops  ABDOMEN: Soft, Nontender, Nondistended; Bowel sounds present  EXTREMITIES:  2+ Peripheral Pulses, No clubbing, cyanosis, or edema  PSYCH: AAOx3  NEUROLOGY: non-focal  SKIN: No rashes or lesions    LABS:                        9.4    6.60  )-----------( 125      ( 07 Sep 2024 18:00 )             29.2     09-07    143  |  112<H>  |  19  ----------------------------<  102<H>  4.0   |  24  |  0.69    Ca    9.6      07 Sep 2024 05:30  Phos  3.0     09-07  Mg     1.8     09-07      PT/INR - ( 06 Sep 2024 15:52 )   PT: 10.7 sec;   INR: 0.94          PTT - ( 06 Sep 2024 15:52 )  PTT:31.2 sec      Urinalysis Basic - ( 07 Sep 2024 05:30 )    Color: x / Appearance: x / SG: x / pH: x  Gluc: 102 mg/dL / Ketone: x  / Bili: x / Urobili: x   Blood: x / Protein: x / Nitrite: x   Leuk Esterase: x / RBC: x / WBC x   Sq Epi: x / Non Sq Epi: x / Bacteria: x        RADIOLOGY & ADDITIONAL TESTS:    Imaging Personally Reviewed:    Consultant(s) Notes Reviewed:      Care Discussed with Consultants/Other Providers:

## 2024-09-08 NOTE — PROGRESS NOTE ADULT - SUBJECTIVE AND OBJECTIVE BOX
INTERVAL HPI/OVERNIGHT EVENTS:    STATUS POST:      POST OPERATIVE DAY #:     SUBJECTIVE: Pt seen and examined at bedside this am by surgery team. Tolerating diet, pain well controlled. Denies f/n/v/cp/sob.    MEDICATIONS  (STANDING):  lactated ringers. 1000 milliLiter(s) (130 mL/Hr) IV Continuous <Continuous>  pantoprazole  Injectable 40 milliGRAM(s) IV Push daily    MEDICATIONS  (PRN):  ondansetron Injectable 4 milliGRAM(s) IV Push every 6 hours PRN Nausea      Vital Signs Last 24 Hrs  T(C): 36.5 (08 Sep 2024 05:21), Max: 36.9 (07 Sep 2024 16:32)  T(F): 97.7 (08 Sep 2024 05:21), Max: 98.4 (07 Sep 2024 16:32)  HR: 77 (08 Sep 2024 05:21) (73 - 81)  BP: 118/68 (08 Sep 2024 05:21) (110/70 - 143/81)  BP(mean): --  RR: 17 (08 Sep 2024 05:21) (17 - 18)  SpO2: 96% (08 Sep 2024 05:21) (96% - 99%)    Parameters below as of 08 Sep 2024 05:21  Patient On (Oxygen Delivery Method): room air        Physical Exam:  Constitutional: A&Ox3, NAD  Respiratory: normal work of breathing  Cardiovascular: NSR, RRR  Abdomen: soft, non-tender, non distended, no rebound or gaurding  Incision:  Ostomy:  Genitourinary:  Legs: WWP, SCDs in place, no calf tenderness        I&O's Detail    07 Sep 2024 07:01  -  08 Sep 2024 07:00  --------------------------------------------------------  IN:    Lactated Ringers: 2860 mL  Total IN: 2860 mL    OUT:  Total OUT: 0 mL    Total NET: 2860 mL          LABS:                        9.4    6.60  )-----------( 125      ( 07 Sep 2024 18:00 )             29.2     09-07    143  |  112<H>  |  19  ----------------------------<  102<H>  4.0   |  24  |  0.69    Ca    9.6      07 Sep 2024 05:30  Phos  3.0     09-07  Mg     1.8     09-07      PT/INR - ( 06 Sep 2024 15:52 )   PT: 10.7 sec;   INR: 0.94          PTT - ( 06 Sep 2024 15:52 )  PTT:31.2 sec  Urinalysis Basic - ( 07 Sep 2024 05:30 )    Color: x / Appearance: x / SG: x / pH: x  Gluc: 102 mg/dL / Ketone: x  / Bili: x / Urobili: x   Blood: x / Protein: x / Nitrite: x   Leuk Esterase: x / RBC: x / WBC x   Sq Epi: x / Non Sq Epi: x / Bacteria: x        RADIOLOGY & ADDITIONAL STUDIES: INTERVAL HPI/OVERNIGHT EVENTS: CARLOS A    SUBJECTIVE: Pt seen and examined at bedside this am by surgery team. Tolerating diet, pain well controlled. Denies f/n/v/cp/sob. She has not had a bowel movement.    MEDICATIONS  (STANDING):  lactated ringers. 1000 milliLiter(s) (130 mL/Hr) IV Continuous <Continuous>  pantoprazole  Injectable 40 milliGRAM(s) IV Push daily    MEDICATIONS  (PRN):  ondansetron Injectable 4 milliGRAM(s) IV Push every 6 hours PRN Nausea      Vital Signs Last 24 Hrs  T(C): 36.5 (08 Sep 2024 05:21), Max: 36.9 (07 Sep 2024 16:32)  T(F): 97.7 (08 Sep 2024 05:21), Max: 98.4 (07 Sep 2024 16:32)  HR: 77 (08 Sep 2024 05:21) (73 - 81)  BP: 118/68 (08 Sep 2024 05:21) (110/70 - 143/81)  BP(mean): --  RR: 17 (08 Sep 2024 05:21) (17 - 18)  SpO2: 96% (08 Sep 2024 05:21) (96% - 99%)    Parameters below as of 08 Sep 2024 05:21  Patient On (Oxygen Delivery Method): room air        Physical Exam:  Constitutional: A&Ox3, NAD  Respiratory: normal work of breathing  Cardiovascular: NSR, RRR  Abdomen: soft, non-tender, non distended, no rebound or gaurding  Legs: WWP, SCDs in place, no calf tenderness        I&O's Detail    07 Sep 2024 07:01  -  08 Sep 2024 07:00  --------------------------------------------------------  IN:    Lactated Ringers: 2860 mL  Total IN: 2860 mL    OUT:  Total OUT: 0 mL    Total NET: 2860 mL          LABS:                        9.4    6.60  )-----------( 125      ( 07 Sep 2024 18:00 )             29.2     09-07    143  |  112<H>  |  19  ----------------------------<  102<H>  4.0   |  24  |  0.69    Ca    9.6      07 Sep 2024 05:30  Phos  3.0     09-07  Mg     1.8     09-07      PT/INR - ( 06 Sep 2024 15:52 )   PT: 10.7 sec;   INR: 0.94          PTT - ( 06 Sep 2024 15:52 )  PTT:31.2 sec  Urinalysis Basic - ( 07 Sep 2024 05:30 )    Color: x / Appearance: x / SG: x / pH: x  Gluc: 102 mg/dL / Ketone: x  / Bili: x / Urobili: x   Blood: x / Protein: x / Nitrite: x   Leuk Esterase: x / RBC: x / WBC x   Sq Epi: x / Non Sq Epi: x / Bacteria: x        RADIOLOGY & ADDITIONAL STUDIES:

## 2024-09-09 ENCOUNTER — APPOINTMENT (OUTPATIENT)
Dept: GASTROENTEROLOGY | Facility: CLINIC | Age: 64
End: 2024-09-09

## 2024-09-09 LAB
ANION GAP SERPL CALC-SCNC: 9 MMOL/L — SIGNIFICANT CHANGE UP (ref 5–17)
APTT BLD: 34.3 SEC — SIGNIFICANT CHANGE UP (ref 24.5–35.6)
BUN SERPL-MCNC: 5 MG/DL — LOW (ref 7–23)
CALCIUM SERPL-MCNC: 9.6 MG/DL — SIGNIFICANT CHANGE UP (ref 8.4–10.5)
CHLORIDE SERPL-SCNC: 109 MMOL/L — HIGH (ref 96–108)
CO2 SERPL-SCNC: 24 MMOL/L — SIGNIFICANT CHANGE UP (ref 22–31)
CREAT SERPL-MCNC: 0.6 MG/DL — SIGNIFICANT CHANGE UP (ref 0.5–1.3)
EGFR: 101 ML/MIN/1.73M2 — SIGNIFICANT CHANGE UP
GLUCOSE SERPL-MCNC: 88 MG/DL — SIGNIFICANT CHANGE UP (ref 70–99)
HCT VFR BLD CALC: 29.6 % — LOW (ref 34.5–45)
HGB BLD-MCNC: 9.4 G/DL — LOW (ref 11.5–15.5)
INR BLD: 0.94 — SIGNIFICANT CHANGE UP (ref 0.85–1.18)
MAGNESIUM SERPL-MCNC: 1.8 MG/DL — SIGNIFICANT CHANGE UP (ref 1.6–2.6)
MCHC RBC-ENTMCNC: 29.9 PG — SIGNIFICANT CHANGE UP (ref 27–34)
MCHC RBC-ENTMCNC: 31.8 GM/DL — LOW (ref 32–36)
MCV RBC AUTO: 94.3 FL — SIGNIFICANT CHANGE UP (ref 80–100)
NRBC # BLD: 0 /100 WBCS — SIGNIFICANT CHANGE UP (ref 0–0)
PHOSPHATE SERPL-MCNC: 2.3 MG/DL — LOW (ref 2.5–4.5)
PLATELET # BLD AUTO: 142 K/UL — LOW (ref 150–400)
POTASSIUM SERPL-MCNC: 3.3 MMOL/L — LOW (ref 3.5–5.3)
POTASSIUM SERPL-SCNC: 3.3 MMOL/L — LOW (ref 3.5–5.3)
PROTHROM AB SERPL-ACNC: 10.7 SEC — SIGNIFICANT CHANGE UP (ref 9.5–13)
RBC # BLD: 3.14 M/UL — LOW (ref 3.8–5.2)
RBC # FLD: 15 % — HIGH (ref 10.3–14.5)
SODIUM SERPL-SCNC: 142 MMOL/L — SIGNIFICANT CHANGE UP (ref 135–145)
WBC # BLD: 6.19 K/UL — SIGNIFICANT CHANGE UP (ref 3.8–10.5)
WBC # FLD AUTO: 6.19 K/UL — SIGNIFICANT CHANGE UP (ref 3.8–10.5)

## 2024-09-09 PROCEDURE — 99233 SBSQ HOSP IP/OBS HIGH 50: CPT

## 2024-09-09 RX ORDER — ACETAMINOPHEN 325 MG/1
1000 TABLET ORAL ONCE
Refills: 0 | Status: COMPLETED | OUTPATIENT
Start: 2024-09-09 | End: 2024-09-09

## 2024-09-09 RX ORDER — SODIUM PHOSPHATE, MONOBASIC, MONOHYDRATE AND SODIUM PHOSPHATE, DIBASIC ANHYDROUS 276; 142 MG/ML; MG/ML
21 INJECTION, SOLUTION INTRAVENOUS ONCE
Refills: 0 | Status: COMPLETED | OUTPATIENT
Start: 2024-09-09 | End: 2024-09-09

## 2024-09-09 RX ORDER — PANTOPRAZOLE SODIUM 40 MG
40 TABLET, DELAYED RELEASE (ENTERIC COATED) ORAL DAILY
Refills: 0 | Status: DISCONTINUED | OUTPATIENT
Start: 2024-09-09 | End: 2024-09-10

## 2024-09-09 RX ORDER — POTASSIUM CHLORIDE 10 MEQ
20 TABLET, EXT RELEASE, PARTICLES/CRYSTALS ORAL
Refills: 0 | Status: COMPLETED | OUTPATIENT
Start: 2024-09-09 | End: 2024-09-09

## 2024-09-09 RX ORDER — ACETAMINOPHEN 325 MG/1
650 TABLET ORAL ONCE
Refills: 0 | Status: COMPLETED | OUTPATIENT
Start: 2024-09-09 | End: 2024-09-09

## 2024-09-09 RX ORDER — LOSARTAN POTASSIUM 50 MG/1
100 TABLET ORAL DAILY
Refills: 0 | Status: DISCONTINUED | OUTPATIENT
Start: 2024-09-09 | End: 2024-09-10

## 2024-09-09 RX ORDER — BENZOCAINE/MENTHOL 20 %-0.5 %
1 AEROSOL (GRAM) TOPICAL ONCE
Refills: 0 | Status: COMPLETED | OUTPATIENT
Start: 2024-09-09 | End: 2024-09-09

## 2024-09-09 RX ORDER — METOPROLOL TARTRATE 100 MG/1
100 TABLET ORAL DAILY
Refills: 0 | Status: DISCONTINUED | OUTPATIENT
Start: 2024-09-09 | End: 2024-09-10

## 2024-09-09 RX ORDER — HYDROXYCHLOROQUINE SULFATE 200 MG/1
200 TABLET, FILM COATED ORAL
Refills: 0 | Status: DISCONTINUED | OUTPATIENT
Start: 2024-09-09 | End: 2024-09-10

## 2024-09-09 RX ADMIN — HYDROXYCHLOROQUINE SULFATE 200 MILLIGRAM(S): 200 TABLET, FILM COATED ORAL at 18:18

## 2024-09-09 RX ADMIN — Medication 5 MILLIGRAM(S): at 22:50

## 2024-09-09 RX ADMIN — ACETAMINOPHEN 400 MILLIGRAM(S): 325 TABLET ORAL at 11:38

## 2024-09-09 RX ADMIN — Medication 40 MILLIGRAM(S): at 12:09

## 2024-09-09 RX ADMIN — Medication 20 MILLIEQUIVALENT(S): at 20:07

## 2024-09-09 RX ADMIN — ACETAMINOPHEN 1000 MILLIGRAM(S): 325 TABLET ORAL at 12:00

## 2024-09-09 RX ADMIN — Medication 20 MILLIEQUIVALENT(S): at 17:41

## 2024-09-09 RX ADMIN — Medication 140 MILLILITER(S): at 05:49

## 2024-09-09 RX ADMIN — SODIUM PHOSPHATE, MONOBASIC, MONOHYDRATE AND SODIUM PHOSPHATE, DIBASIC ANHYDROUS 62.5 MILLIMOLE(S): 276; 142 INJECTION, SOLUTION INTRAVENOUS at 12:46

## 2024-09-09 RX ADMIN — Medication 140 MILLILITER(S): at 00:00

## 2024-09-09 RX ADMIN — ACETAMINOPHEN 650 MILLIGRAM(S): 325 TABLET ORAL at 22:50

## 2024-09-09 RX ADMIN — Medication 1 LOZENGE: at 22:50

## 2024-09-09 RX ADMIN — Medication 100 GRAM(S): at 10:17

## 2024-09-09 NOTE — PROGRESS NOTE ADULT - SUBJECTIVE AND OBJECTIVE BOX
INTERVAL HPI/OVERNIGHT EVENTS: CHELO STEARNS    a/w rectal bleeding    SUBJECTIVE:  Patient seen and examined at bedside this morning, denies any acute complaints, reports that she has not had a bloody bowel movement in days and that she has passed clear bowel movements since the bowel prep. Denies any nausea, vomiting, CP or SOB.     MEDICATIONS  (STANDING):  lactated ringers. 1000 milliLiter(s) (140 mL/Hr) IV Continuous <Continuous>  melatonin Liquid 5 milliGRAM(s) Oral at bedtime  pantoprazole  Injectable 40 milliGRAM(s) IV Push daily  potassium chloride    Tablet ER 20 milliEquivalent(s) Oral every 2 hours  sodium phosphate 15 milliMole(s)/250 mL IVPB 21 milliMole(s) IV Intermittent once    MEDICATIONS  (PRN):  ondansetron Injectable 4 milliGRAM(s) IV Push every 6 hours PRN Nausea      Vital Signs Last 24 Hrs  T(C): 36.8 (09 Sep 2024 08:46), Max: 36.8 (09 Sep 2024 08:46)  T(F): 98.3 (09 Sep 2024 08:46), Max: 98.3 (09 Sep 2024 08:46)  HR: 71 (09 Sep 2024 08:46) (71 - 94)  BP: 142/83 (09 Sep 2024 08:46) (142/83 - 169/90)  BP(mean): 110 (09 Sep 2024 05:10) (107 - 110)  RR: 17 (09 Sep 2024 08:46) (16 - 18)  SpO2: 96% (09 Sep 2024 08:46) (96% - 98%)    Parameters below as of 09 Sep 2024 08:46  Patient On (Oxygen Delivery Method): room air        PHYSICAL EXAM:  Constitutional: AAOx3, no acute distress  HEENT: NCAT, airway patent  Cardiovascular: RRR, pulses present bilaterally  Respiratory: nonlabored breathing  Gastrointestinal: abdomen soft, nontender, non distended, no rebound or guarding  Neuro: no focal deficits  Extremities: non edematous, no calf pain bilaterally     I&O's Detail    08 Sep 2024 07:01  -  09 Sep 2024 07:00  --------------------------------------------------------  IN:    Lactated Ringers: 260 mL    Lactated Ringers: 1120 mL    Oral Fluid: 2560 mL  Total IN: 3940 mL    OUT:    Voided (mL): 1400 mL  Total OUT: 1400 mL    Total NET: 2540 mL      09 Sep 2024 07:01  -  09 Sep 2024 10:29  --------------------------------------------------------  IN:    Lactated Ringers: 140 mL  Total IN: 140 mL    OUT:  Total OUT: 0 mL    Total NET: 140 mL          LABS:                        9.4    6.19  )-----------( 142      ( 09 Sep 2024 05:30 )             29.6     09-09    142  |  109<H>  |  5<L>  ----------------------------<  88  3.3<L>   |  24  |  0.60    Ca    9.6      09 Sep 2024 05:30  Phos  2.3     09-09  Mg     1.8     09-09      PT/INR - ( 09 Sep 2024 05:30 )   PT: 10.7 sec;   INR: 0.94          PTT - ( 09 Sep 2024 05:30 )  PTT:34.3 sec  Urinalysis Basic - ( 09 Sep 2024 05:30 )    Color: x / Appearance: x / SG: x / pH: x  Gluc: 88 mg/dL / Ketone: x  / Bili: x / Urobili: x   Blood: x / Protein: x / Nitrite: x   Leuk Esterase: x / RBC: x / WBC x   Sq Epi: x / Non Sq Epi: x / Bacteria: x

## 2024-09-09 NOTE — CHART NOTE - NSCHARTNOTEFT_GEN_A_CORE
Patient is s/p Colonoscopy performed in Endoscopy    Findings:  Multiple diverticula were seen in the whole colon. There was scant diverticulosis in right colon. The most significant burden of diverticulosis was seen in the left colon and was most pronounced in the sigmoid colon. There was no stigmata of recent or active bleeding.	  Normal mucosa was noted in the terminal ileum.	  The colon was otherwise unremarkable.    Plan:	  Advance diet as tolerated  High Fiber Diet  Colonoscopy in 5 years.  Return to floor for further management    Thank you for allowing us to participate in the care of this patient. GI will sign off the case.  Please call us if you have any further questions or concerns    Ras Lewis M.D.  Gastroenterology Fellow  GI Consult Weekday 7am-5pm Pager: [phone]  Weeknights/Weekend/Holiday Coverage: Please Call the  for contact info  Follow Up Questions welcome via [facility] [employer] Teams Messenger

## 2024-09-09 NOTE — PROGRESS NOTE ADULT - SUBJECTIVE AND OBJECTIVE BOX
Patient is a 64y old  Female who presents with a chief complaint of Rectal bleeding (09 Sep 2024 11:36)      INTERVAL HPI/OVERNIGHT EVENTS: No acute overnight events. Denies melena, hematochezia, abd pain or any other complaints     MEDICATIONS  (STANDING):  lactated ringers. 1000 milliLiter(s) (140 mL/Hr) IV Continuous <Continuous>  melatonin Liquid 5 milliGRAM(s) Oral at bedtime  pantoprazole  Injectable 40 milliGRAM(s) IV Push daily    MEDICATIONS  (PRN):  ondansetron Injectable 4 milliGRAM(s) IV Push every 6 hours PRN Nausea      __________________________________________________      Vital Signs Last 24 Hrs  T(C): 36.8 (09 Sep 2024 08:46), Max: 36.8 (09 Sep 2024 08:46)  T(F): 98.3 (09 Sep 2024 08:46), Max: 98.3 (09 Sep 2024 08:46)  HR: 71 (09 Sep 2024 08:46) (71 - 94)  BP: 142/83 (09 Sep 2024 08:46) (142/83 - 169/90)  BP(mean): 110 (09 Sep 2024 05:10) (107 - 110)  RR: 17 (09 Sep 2024 08:46) (16 - 18)  SpO2: 96% (09 Sep 2024 08:46) (96% - 98%)    Parameters below as of 09 Sep 2024 08:46  Patient On (Oxygen Delivery Method): room air        ________________________________________________  PHYSICAL EXAM:  GENERAL: NAD  HEENT: moist mucous membranes;   NECK : supple  CHEST/LUNG: Clear to auscultation bilaterally with good air entry   HEART: S1 S2  regular  ABDOMEN: Soft, Nontender, Nondistended  EXTREMITIES: no cyanosis; no edema; no calf tenderness  SKIN: warm and dry; no rash  NERVOUS SYSTEM:  Awake and alert; Oriented  to place, person and time ; no new deficits    _________________________________________________  LABS:                        9.4    6.19  )-----------( 142      ( 09 Sep 2024 05:30 )             29.6     09-09    142  |  109<H>  |  5<L>  ----------------------------<  88  3.3<L>   |  24  |  0.60    Ca    9.6      09 Sep 2024 05:30  Phos  2.3     09-09  Mg     1.8     09-09      PT/INR - ( 09 Sep 2024 05:30 )   PT: 10.7 sec;   INR: 0.94          PTT - ( 09 Sep 2024 05:30 )  PTT:34.3 sec  Urinalysis Basic - ( 09 Sep 2024 05:30 )    Color: x / Appearance: x / SG: x / pH: x  Gluc: 88 mg/dL / Ketone: x  / Bili: x / Urobili: x   Blood: x / Protein: x / Nitrite: x   Leuk Esterase: x / RBC: x / WBC x   Sq Epi: x / Non Sq Epi: x / Bacteria: x      CAPILLARY BLOOD GLUCOSE            RADIOLOGY & ADDITIONAL TESTS:      Plan of care was discussed with patient and /or primary care giver; all questions and concerns were addressed and care was aligned with patient's wishes.

## 2024-09-09 NOTE — PRE-ANESTHESIA EVALUATION ADULT - NSANTHPMHFT_GEN_ALL_CORE
Cardiac: Positive for HTN. Denies HLD, MI/Angina/Heart Failure, Arrhythmia, Murmur/Valvular Disorder. >4 METS  Pulmonary: Denies Asthma, COPD, CIRILO  Renal: Denies kidney dysfunction  Hepatic: Denies liver dysfunction  Gastrointestinal: Positive for hematochezia likely diverticulosis. Denies GERD/IBS.  Endocrine: Denies DM or thyroid dysfunction.  Neurologic: Denies stroke/seizure disorder  Hematologic: Positive for rectal bleeding. Denies blood clotting disorder, blood thinning medication.  Musculoskeletal: Positive for rheumatoid arthritis.     PSH: Non-contributory.

## 2024-09-09 NOTE — PROGRESS NOTE ADULT - TIME BILLING
Time exclusive of ACP discussion  Time inclusive of chart review, medication ordering, discussion with primary team, clinical documentation, and communication with family/caregiver
Review of hospital course, labs, vitals, medical records.   Bedside exam and interview   Discussed plan of care with primary team ACP and housestaff   Documenting the encounter

## 2024-09-09 NOTE — PRE-ANESTHESIA EVALUATION ADULT - NSDENTALSD_ENT_ALL_CORE
2022    SEIZURE ACTION PLAN    Patient: Kavon Helm  : 2012   Date: 10/4/2022     Treating Provider: Dr. Carla Alvarez  Clinic:  [facility]  Phone: [phone]  Fax: [phone]    Significant Medical History:   Focal epilepsy with secondary generalization    Seizure Types/Description:   Often start with jerking in 1 leg then spread to full body, may last > 5 minutes.  Confused after.  Most often happen with illness and when asleep.      Basic Seizure First Aid  . Stay calm & track time  . Keep child safe  . Do not restrain  . Do not put anything in mouth  . Stay with child until fully conscious  . Record seizure in log    For tonic-clonic seizure:  . Protect head  . Keep airway open/watch breathing  . Turn child on side    A seizure is generally considered an emergency when  . Convulsive (tonic-clonic) seizure lasts longer than 5 minutes  . Student has repeated seizures without regaining consciousness  - Breathing does not return to normal once seizure has stopped  . Student is injured due to seizure  . Student has breathing difficulties  . Student has a seizure in water    Emergency Response:  1. Contact school nurse  2. Administer emergency medication: Nayzalim (midazolam) 5 mg for seizure > 3 minutes  3. Contact family  4. If unable to obtain school nurse or seizure does not stop with medication, breathing does not normalize after seizure has stopped, please call 911.  5. If in emergency as noted above, call 911.     abdominal mass                                           Missing several rear molars. Poor dentition with several chipped teeth, ground/worn teeth, cavities./missing teeth Missing several rear molars, premolars, teeth. Over half of native teeth missing. Removable upper dentures, removed prior to entry into operating room. Poor dentition with several chipped teeth, ground/worn teeth, cavities./caps/bridge/implants/missing teeth

## 2024-09-10 ENCOUNTER — TRANSCRIPTION ENCOUNTER (OUTPATIENT)
Age: 64
End: 2024-09-10

## 2024-09-10 VITALS
SYSTOLIC BLOOD PRESSURE: 153 MMHG | TEMPERATURE: 98 F | HEART RATE: 72 BPM | OXYGEN SATURATION: 99 % | RESPIRATION RATE: 17 BRPM | DIASTOLIC BLOOD PRESSURE: 87 MMHG

## 2024-09-10 PROCEDURE — 85730 THROMBOPLASTIN TIME PARTIAL: CPT

## 2024-09-10 PROCEDURE — 36000 PLACE NEEDLE IN VEIN: CPT

## 2024-09-10 PROCEDURE — 99285 EMERGENCY DEPT VISIT HI MDM: CPT | Mod: 25

## 2024-09-10 PROCEDURE — 86850 RBC ANTIBODY SCREEN: CPT

## 2024-09-10 PROCEDURE — 85610 PROTHROMBIN TIME: CPT

## 2024-09-10 PROCEDURE — 74174 CTA ABD&PLVS W/CONTRAST: CPT | Mod: MC

## 2024-09-10 PROCEDURE — 84100 ASSAY OF PHOSPHORUS: CPT

## 2024-09-10 PROCEDURE — 36415 COLL VENOUS BLD VENIPUNCTURE: CPT

## 2024-09-10 PROCEDURE — 85027 COMPLETE CBC AUTOMATED: CPT

## 2024-09-10 PROCEDURE — 86900 BLOOD TYPING SEROLOGIC ABO: CPT

## 2024-09-10 PROCEDURE — 80048 BASIC METABOLIC PNL TOTAL CA: CPT

## 2024-09-10 PROCEDURE — 84484 ASSAY OF TROPONIN QUANT: CPT

## 2024-09-10 PROCEDURE — 85025 COMPLETE CBC W/AUTO DIFF WBC: CPT

## 2024-09-10 PROCEDURE — 83735 ASSAY OF MAGNESIUM: CPT

## 2024-09-10 PROCEDURE — 82962 GLUCOSE BLOOD TEST: CPT

## 2024-09-10 PROCEDURE — 86901 BLOOD TYPING SEROLOGIC RH(D): CPT

## 2024-09-10 RX ORDER — HYDROXYCHLOROQUINE SULFATE 200 MG/1
0 TABLET, FILM COATED ORAL
Qty: 0 | Refills: 0 | DISCHARGE

## 2024-09-10 RX ADMIN — LOSARTAN POTASSIUM 100 MILLIGRAM(S): 50 TABLET ORAL at 06:20

## 2024-09-10 RX ADMIN — METOPROLOL TARTRATE 100 MILLIGRAM(S): 100 TABLET ORAL at 06:20

## 2024-09-10 RX ADMIN — HYDROXYCHLOROQUINE SULFATE 200 MILLIGRAM(S): 200 TABLET, FILM COATED ORAL at 06:20

## 2024-09-10 NOTE — PROGRESS NOTE ADULT - REASON FOR ADMISSION
Rectal bleeding

## 2024-09-10 NOTE — DISCHARGE NOTE PROVIDER - CARE PROVIDERS DIRECT ADDRESSES
[email or web address]

## 2024-09-10 NOTE — DISCHARGE NOTE PROVIDER - CARE PROVIDER_API CALL
Faheem Ordonez  Surgery  [address]  [place] [address]  Phone: [phone]  Fax: [phone]  Follow Up Time:

## 2024-09-10 NOTE — DISCHARGE NOTE PROVIDER - HOSPITAL COURSE
64 year old female patient with complaints of bloody stools for 3 days. Patient was to be discharged when felt dizzy, had another bloody bowel movement with blood clots and Hgb drop to 11.2 for 12.2 on admission. Patient refers no history of constipation, and not having active external hemorrhoids in over 10 years, but occasional blood on wiping Labs on admission remarkable for Hgb 11.2 from 12.2. Surgery consulted for rectal bleeding. Colonoscopy was performed which found multiple diverticula throughout the whole colon, scant diverticulosis in right colon, the most significant burden of diverticulosis was seen in the left colon and was most pronounced in the sigmoid colon. There was no stigmata of recent or active bleeding. Normal mucosa was noted in the terminal ileum and the colon was otherwise unremarkable. Patients symptoms resolved. Patient's course was uncomplicated. Diet was advanced as tolerated and pain was well controlled on medication. On day of discharge, pt deemed stable and ready to return home with plan to follow up as an outpatient.

## 2024-09-10 NOTE — DISCHARGE NOTE NURSING/CASE MANAGEMENT/SOCIAL WORK - PATIENT PORTAL LINK FT
You can access the FollowMyHealth Patient Portal offered by [facility] by registering at the following website: [email or web address]. By joining [employer]’s FollowMyHealth portal, you will also be able to view your health information using other applications (apps) compatible with our system.

## 2024-09-10 NOTE — DISCHARGE NOTE PROVIDER - NSDCMRMEDTOKEN_GEN_ALL_CORE_FT
hydroCHLOROthiazide 25 mg oral tablet: 1 tab(s) orally once a day  hydroxychloroquine: once a day  losartan 100 mg oral tablet: 1 tab(s) orally once a day  Methotrexate:   metoprolol succinate 100 mg oral capsule, extended release: 1 cap(s) orally once a day

## 2024-09-10 NOTE — PROGRESS NOTE ADULT - SUBJECTIVE AND OBJECTIVE BOX
SUBJECTIVE: Feeling well, no complaints, no blood, +flatus, no BM. Patient seen and examined bedside by chief resident.    hydroxychloroquine 200 milliGRAM(s) Oral two times a day  losartan 100 milliGRAM(s) Oral daily  metoprolol succinate  milliGRAM(s) Oral daily    MEDICATIONS  (PRN):  ondansetron Injectable 4 milliGRAM(s) IV Push every 6 hours PRN Nausea      I&O's Detail    09 Sep 2024 07:01  -  10 Sep 2024 07:00  --------------------------------------------------------  IN:    Lactated Ringers: 140 mL  Total IN: 140 mL    OUT:    Voided (mL): 400 mL  Total OUT: 400 mL    Total NET: -260 mL          Vital Signs Last 24 Hrs  T(C): 36.7 (10 Sep 2024 05:35), Max: 36.9 (09 Sep 2024 21:25)  T(F): 98 (10 Sep 2024 05:35), Max: 98.4 (09 Sep 2024 21:25)  HR: 83 (10 Sep 2024 05:35) (71 - 98)  BP: 118/69 (10 Sep 2024 05:35) (118/69 - 142/84)  BP(mean): 110 (09 Sep 2024 13:50) (110 - 110)  RR: 18 (10 Sep 2024 05:35) (17 - 18)  SpO2: 99% (10 Sep 2024 05:35) (96% - 99%)    Parameters below as of 09 Sep 2024 21:25  Patient On (Oxygen Delivery Method): room air        General: NAD, sitting up  C/V: NSR  Pulm: Nonlabored breathing, no respiratory distress  Abd: soft, NT/ND  Extrem: WWP    LABS:                        9.4    6.19  )-----------( 142      ( 09 Sep 2024 05:30 )             29.6     09-09    142  |  109<H>  |  5<L>  ----------------------------<  88  3.3<L>   |  24  |  0.60    Ca    9.6      09 Sep 2024 05:30  Phos  2.3     09-09  Mg     1.8     09-09      PT/INR - ( 09 Sep 2024 05:30 )   PT: 10.7 sec;   INR: 0.94          PTT - ( 09 Sep 2024 05:30 )  PTT:34.3 sec  Urinalysis Basic - ( 09 Sep 2024 05:30 )    Color: x / Appearance: x / SG: x / pH: x  Gluc: 88 mg/dL / Ketone: x  / Bili: x / Urobili: x   Blood: x / Protein: x / Nitrite: x   Leuk Esterase: x / RBC: x / WBC x   Sq Epi: x / Non Sq Epi: x / Bacteria: x

## 2024-09-10 NOTE — PROGRESS NOTE ADULT - ASSESSMENT
64 year old female patient with complaints of bloody stools for 3 days. Patient was to be discharged when felt dizzy, had another bloody bowel movement with blood clots and Hgb drop to 11.2 for 12.2 on admission. Patient refers no history of constipation, and not having active external hemorrhoids in over 10 years, but occasional blood on wiping Labs on admission remarkable for Hgb 11.2 from 12.2. Surgery consulted for rectal bleeding, now resolving    CLD  Pain/nausea control  OOB/IS/SCDs  Likely dc home
64 year old female patient with complaints of bloody stools for 3 days. Patient was to be discharged when felt dizzy, had another bloody bowel movement with blood clots and Hgb drop to 11.2 for 12.2 on admission. Patient refers no history of constipation, and not having active external hemorrhoids in over 10 years, but occasional blood on wiping Abdomen soft, NT, ND, no guarding or rigidity. MELCHOR small gush of stool and dark blood clots, and skin tags on perianal area with non-thrombosed, non bleeding external hemorrhoids. Labs remarkable for Hgb 11.2 from 12.2. Surgery consulted for rectal bleeding.      CLD/IVF  GI consult with Dr. Whipple for possible colonoscopy  2 large bore IV catheters  Strict I/Os  OOB/IS  AM labs  
64 year old female patient with complaints of bloody stools for 3 days. Patient was to be discharged when felt dizzy, had another bloody bowel movement with blood clots and Hgb drop to 11.2 for 12.2 on admission. Patient refers no history of constipation, and not having active external hemorrhoids in over 10 years, but occasional blood on wiping Abdomen soft, NT, ND, no guarding or rigidity. MELCHOR small gush of stool and dark blood clots, and skin tags on perianal area with non-thrombosed, non bleeding external hemorrhoids. Labs remarkable for Hgb 11.2 from 12.2. Surgery consulted for rectal bleeding.      NPO/IVF  CTA A/P   GI consult with Dr. Whipple for possible colonoscopy  2 large bore IV catheters  Trend Hgb q6hrs  Strict I/Os  OOB/IS  AM labs    Plan discussed with attending and chief resident.   ____________________________________________________  Mansi Oneil - Resident   Surgery 
64 year old female patient with complaints of bloody stools for 3 days. Patient was to be discharged when felt dizzy, had another bloody bowel movement with blood clots and Hgb drop to 11.2 for 12.2 on admission. Patient refers no history of constipation, and not having active external hemorrhoids in over 10 years, but occasional blood on wiping Abdomen soft, NT, ND, no guarding or rigidity. MELCHOR small gush of stool and dark blood clots, and skin tags on perianal area with non-thrombosed, non bleeding external hemorrhoids. Labs remarkable for Hgb 11.2 from 12.2. Surgery consulted for rectal bleeding.      Plan:  - c- scope today   - trend H/H  - I&O's  - OOBA  - no acute surgical issues at this time
64 F with HTN, RA, hemorrhoids, presented with hematochezia, believed to be diverticular in nature.    # Hematochezia:  - Likely in the setting of diverticulosis.   - Closely monitored with Q12 hour CBCs.  - Two large bore IVs.  - IV fluids.  - GI following.  - plan for colonoscopy 9/9/24.  - Supportive care.  # HTN:  - HCTZ, Losartan and Metoprolol on hold.  - Blood pressure within limits.  # RA:  - MTX on hold.  # DVT prophylaxis:  - PAS  
64 year old female patient with PMH of HTN, Rheumatoid arthritis, internal and external hemorrhoids who presented w/ multiple episodes of rectal bleeding.    #Hematochezia  #LGIB  - Patient w/ multiple episodes of hematochezia c/f LGIB. Most likely 2/2 diverticular bleed given description of symptoms but patient has not had prior endoscopic evaluation so warrants colonoscopic evaluation to assess for other causes of hematochezia. LGIB seems to have stopped as patient has had no further episodes of hematochezia  Recommendations:  - Trend CBC, maintain active T&S  - Maintain 2 large bore IVs  - Clear liquid diet today  - Golytely 4 L today, if BMs not clear after completing 4 L Golytely then recommend additional 1-2 L of Golytely  - Plan for colonoscopy on 9/9  - NPO at midnight, send pre op labs     Case discussed w/ GI attending Dr. Emma Moore MD  PGY-4 GI Fellow  GI consult pager (M-F 7a-0p): [phone]  Please call  for on-call fellow after hours
64 F with HTN, RA, hemorrhoids, presented with hematochezia, believed to be diverticular in nature.    1) Hematochezia:  - Likely in the setting of diverticulosis.   - Closely monitored with Q8 hour CBCs.  - Two large bore IVs.  - IV fluids.  - GI following.  - Tentative plan for colonoscopy 9/9/24.  - Supportive care.  2) HTN:  - HCTZ, Losartan and Metoprolol on hold.  - Blood pressure within limits.  3) RA:  - MTX on hold.  4) DVT prophylaxis:  - PAS

## 2024-09-10 NOTE — DISCHARGE NOTE PROVIDER - NSDCFUADDINST_GEN_ALL_CORE_FT
Please call your doctor or return to the ER if symptoms return.    Warning Signs:  Please call your doctor if you experience the following:  *You experience new chest pain, pressure, squeezing or tightness.  *New or worsening cough, shortness of breath, or wheeze.  *If you are vomiting and cannot keep down fluids or your medications.  *You are getting dehydrated due to continued vomiting, diarrhea, or other reasons. Signs of dehydration include dry mouth, rapid heartbeat, or feeling dizzy or faint when standing.  *You see blood or dark/black material when you vomit or have a bowel movement.  *You experience burning when you urinate, have blood in your urine, or experience a discharge.  *Your pain is not improving within 8-12 hours or is not gone within 24 hours. Call or return immediately if your pain is getting worse, changes location, or moves to your chest or back.  *You have shaking chills, or fever greater than 101.5 degrees Fahrenheit or 38 degrees Celsius.  *Any change in your symptoms, or any new symptoms that concern you.

## 2025-09-17 ENCOUNTER — EMERGENCY (EMERGENCY)
Facility: HOSPITAL | Age: 65
LOS: 1 days | End: 2025-09-17
Attending: EMERGENCY MEDICINE | Admitting: EMERGENCY MEDICINE
Payer: MEDICAID

## 2025-09-17 VITALS
WEIGHT: 229.06 LBS | TEMPERATURE: 98 F | HEART RATE: 86 BPM | OXYGEN SATURATION: 96 % | DIASTOLIC BLOOD PRESSURE: 107 MMHG | RESPIRATION RATE: 18 BRPM | SYSTOLIC BLOOD PRESSURE: 162 MMHG | HEIGHT: 70 IN

## 2025-09-17 VITALS
SYSTOLIC BLOOD PRESSURE: 163 MMHG | OXYGEN SATURATION: 98 % | HEART RATE: 72 BPM | DIASTOLIC BLOOD PRESSURE: 82 MMHG | RESPIRATION RATE: 16 BRPM

## 2025-09-17 LAB — ADD ON TEST-SPECIMEN IN LAB: SIGNIFICANT CHANGE UP

## 2025-09-17 PROCEDURE — 71045 X-RAY EXAM CHEST 1 VIEW: CPT

## 2025-09-17 PROCEDURE — 36415 COLL VENOUS BLD VENIPUNCTURE: CPT

## 2025-09-17 PROCEDURE — 71045 X-RAY EXAM CHEST 1 VIEW: CPT | Mod: 26

## 2025-09-17 PROCEDURE — 82550 ASSAY OF CK (CPK): CPT

## 2025-09-17 PROCEDURE — 80048 BASIC METABOLIC PNL TOTAL CA: CPT

## 2025-09-17 PROCEDURE — 84484 ASSAY OF TROPONIN QUANT: CPT

## 2025-09-17 PROCEDURE — 83690 ASSAY OF LIPASE: CPT

## 2025-09-17 PROCEDURE — 99285 EMERGENCY DEPT VISIT HI MDM: CPT

## 2025-09-17 PROCEDURE — 99285 EMERGENCY DEPT VISIT HI MDM: CPT | Mod: 25

## 2025-09-17 PROCEDURE — 83735 ASSAY OF MAGNESIUM: CPT

## 2025-09-17 PROCEDURE — 86140 C-REACTIVE PROTEIN: CPT

## 2025-09-17 PROCEDURE — 85025 COMPLETE CBC W/AUTO DIFF WBC: CPT

## 2025-09-17 PROCEDURE — 93005 ELECTROCARDIOGRAM TRACING: CPT

## 2025-09-17 PROCEDURE — 87186 SC STD MICRODIL/AGAR DIL: CPT

## 2025-09-17 PROCEDURE — 87086 URINE CULTURE/COLONY COUNT: CPT

## 2025-09-17 PROCEDURE — 96365 THER/PROPH/DIAG IV INF INIT: CPT

## 2025-09-17 PROCEDURE — 84100 ASSAY OF PHOSPHORUS: CPT

## 2025-09-17 PROCEDURE — 93010 ELECTROCARDIOGRAM REPORT: CPT

## 2025-09-17 PROCEDURE — 85652 RBC SED RATE AUTOMATED: CPT

## 2025-09-17 PROCEDURE — 82553 CREATINE MB FRACTION: CPT

## 2025-09-17 PROCEDURE — 81001 URINALYSIS AUTO W/SCOPE: CPT

## 2025-09-17 RX ORDER — CEFTRIAXONE 500 MG/1
1000 INJECTION, POWDER, FOR SOLUTION INTRAMUSCULAR; INTRAVENOUS ONCE
Refills: 0 | Status: COMPLETED | OUTPATIENT
Start: 2025-09-17 | End: 2025-09-17

## 2025-09-17 RX ORDER — AMLODIPINE BESYLATE 10 MG/1
1 TABLET ORAL
Qty: 60 | Refills: 0
Start: 2025-09-17 | End: 2025-10-16

## 2025-09-17 RX ORDER — CEFPODOXIME PROXETIL 200 MG/1
1 TABLET, FILM COATED ORAL
Qty: 14 | Refills: 0
Start: 2025-09-17 | End: 2025-09-23

## 2025-09-17 RX ORDER — AMLODIPINE BESYLATE 10 MG/1
5 TABLET ORAL ONCE
Refills: 0 | Status: COMPLETED | OUTPATIENT
Start: 2025-09-17 | End: 2025-09-17

## 2025-09-17 RX ADMIN — CEFTRIAXONE 1000 MILLIGRAM(S): 500 INJECTION, POWDER, FOR SOLUTION INTRAMUSCULAR; INTRAVENOUS at 14:40

## 2025-09-17 RX ADMIN — CEFTRIAXONE 100 MILLIGRAM(S): 500 INJECTION, POWDER, FOR SOLUTION INTRAMUSCULAR; INTRAVENOUS at 13:55

## 2025-09-17 RX ADMIN — AMLODIPINE BESYLATE 5 MILLIGRAM(S): 10 TABLET ORAL at 12:22

## 2025-09-20 DIAGNOSIS — N39.0 URINARY TRACT INFECTION, SITE NOT SPECIFIED: ICD-10-CM

## 2025-09-20 DIAGNOSIS — R35.0 FREQUENCY OF MICTURITION: ICD-10-CM

## 2025-09-20 DIAGNOSIS — Z88.1 ALLERGY STATUS TO OTHER ANTIBIOTIC AGENTS: ICD-10-CM

## 2025-09-20 DIAGNOSIS — R07.89 OTHER CHEST PAIN: ICD-10-CM

## 2025-09-20 DIAGNOSIS — Z91.048 OTHER NONMEDICINAL SUBSTANCE ALLERGY STATUS: ICD-10-CM

## 2025-09-20 DIAGNOSIS — I10 ESSENTIAL (PRIMARY) HYPERTENSION: ICD-10-CM

## 2025-09-20 DIAGNOSIS — R42 DIZZINESS AND GIDDINESS: ICD-10-CM

## 2025-09-20 DIAGNOSIS — R11.0 NAUSEA: ICD-10-CM
